# Patient Record
Sex: MALE | Race: BLACK OR AFRICAN AMERICAN | NOT HISPANIC OR LATINO | Employment: FULL TIME | URBAN - METROPOLITAN AREA
[De-identification: names, ages, dates, MRNs, and addresses within clinical notes are randomized per-mention and may not be internally consistent; named-entity substitution may affect disease eponyms.]

---

## 2017-09-28 ENCOUNTER — HOSPITAL ENCOUNTER (EMERGENCY)
Facility: OTHER | Age: 47
Discharge: HOME | End: 2017-09-28
Attending: EMERGENCY MEDICINE
Payer: COMMERCIAL

## 2017-09-28 VITALS
HEIGHT: 67 IN | TEMPERATURE: 99 F | HEART RATE: 88 BPM | RESPIRATION RATE: 18 BRPM | BODY MASS INDEX: 27.94 KG/M2 | OXYGEN SATURATION: 99 % | WEIGHT: 178 LBS | DIASTOLIC BLOOD PRESSURE: 81 MMHG | SYSTOLIC BLOOD PRESSURE: 124 MMHG

## 2017-09-28 DIAGNOSIS — T14.90XA INJURY: ICD-10-CM

## 2017-09-28 DIAGNOSIS — S93.401A SPRAIN OF RIGHT ANKLE, UNSPECIFIED LIGAMENT, INITIAL ENCOUNTER: Primary | ICD-10-CM

## 2017-09-28 PROCEDURE — 25000003 PHARM REV CODE 250: Performed by: NURSE PRACTITIONER

## 2017-09-28 PROCEDURE — 99283 EMERGENCY DEPT VISIT LOW MDM: CPT

## 2017-09-28 RX ORDER — IBUPROFEN 600 MG/1
600 TABLET ORAL
Status: COMPLETED | OUTPATIENT
Start: 2017-09-28 | End: 2017-09-28

## 2017-09-28 RX ORDER — DICLOFENAC SODIUM 50 MG/1
50 TABLET, DELAYED RELEASE ORAL 3 TIMES DAILY PRN
Qty: 24 TABLET | Refills: 0 | Status: SHIPPED | OUTPATIENT
Start: 2017-09-28 | End: 2017-10-02

## 2017-09-28 RX ADMIN — IBUPROFEN 600 MG: 600 TABLET, FILM COATED ORAL at 12:09

## 2017-09-28 NOTE — ED PROVIDER NOTES
Encounter Date: 9/28/2017       History     Chief Complaint   Patient presents with    Ankle Pain     A 47-year-old male who presents to the ED with complaints of right ankle pain.  Patient states he was coming down off the ladder and twisted his ankle on yesterday.  Pain is a 7/10.  Pain is described as a sharp pain. Pt took Motrin 800 mg last night with some relief. Pt ambulating with crutches.           Review of patient's allergies indicates:  No Known Allergies  Past Medical History:   Diagnosis Date    Allergy     Hypertension      History reviewed. No pertinent surgical history.  Family History   Problem Relation Age of Onset    Cancer Father      colon    No Known Problems Mother     No Known Problems Sister     No Known Problems Brother     No Known Problems Maternal Aunt     No Known Problems Maternal Uncle     No Known Problems Paternal Aunt     No Known Problems Paternal Uncle     No Known Problems Maternal Grandmother     No Known Problems Maternal Grandfather     No Known Problems Paternal Grandmother     No Known Problems Paternal Grandfather     Amblyopia Neg Hx     Blindness Neg Hx     Cataracts Neg Hx     Diabetes Neg Hx     Glaucoma Neg Hx     Hypertension Neg Hx     Macular degeneration Neg Hx     Retinal detachment Neg Hx     Strabismus Neg Hx     Stroke Neg Hx     Thyroid disease Neg Hx      Social History   Substance Use Topics    Smoking status: Never Smoker    Smokeless tobacco: Not on file    Alcohol use No      Comment: occasional     Review of Systems   Constitutional: Negative for activity change and appetite change.   HENT: Negative.    Eyes: Negative.  Negative for visual disturbance.   Respiratory: Negative for chest tightness and shortness of breath.    Cardiovascular: Negative.  Negative for chest pain and palpitations.   Gastrointestinal: Negative.  Negative for abdominal pain and vomiting.   Musculoskeletal: Negative for arthralgias.        Right ankle  pain    Skin: Negative.  Negative for pallor.   Neurological: Negative.  Negative for dizziness, weakness and headaches.   Hematological: Negative.    Psychiatric/Behavioral: Negative.  Negative for agitation, confusion and suicidal ideas.   All other systems reviewed and are negative.      Physical Exam     Initial Vitals [09/28/17 1233]   BP Pulse Resp Temp SpO2   124/81 88 18 98.6 °F (37 °C) 99 %      MAP       95.33         Physical Exam    Nursing note and vitals reviewed.  Constitutional: Vital signs are normal. He appears well-developed. He is cooperative.   HENT:   Head: Normocephalic.   Right Ear: Tympanic membrane and external ear normal.   Left Ear: Tympanic membrane and external ear normal.   Nose: Nose normal.   Mouth/Throat: Uvula is midline, oropharynx is clear and moist and mucous membranes are normal.   Eyes: Conjunctivae and EOM are normal. Pupils are equal, round, and reactive to light.   Neck: Normal range of motion. Neck supple.   Cardiovascular: Normal rate and regular rhythm.   Pulmonary/Chest: Effort normal and breath sounds normal.   Abdominal: Soft. Normal appearance and bowel sounds are normal. There is no tenderness.   Musculoskeletal:        Right ankle: He exhibits swelling. He exhibits normal range of motion and normal pulse. Tenderness. Lateral malleolus tenderness found.   Right lower leg and right foot with no tenderness or swelling ng noted.   FROM of all other extremities.    Neurological: He is alert and oriented to person, place, and time.   Skin: Skin is warm and dry.   Psychiatric: He has a normal mood and affect. His behavior is normal. Judgment normal.         ED Course   Procedures  Labs Reviewed - No data to display     Imaging Results          X-Ray Ankle Complete Right (Final result)  Result time 09/28/17 13:01:15    Final result by Ted Pacheco MD (09/28/17 13:01:15)                 Impression:        Anterolateral right ankle nonspecific soft tissue swelling,  without acute osseous process seen, which may represent sprain.      Electronically signed by: DENIA HERNANDEZ MD, MD  Date:     09/28/17  Time:    13:01              Narrative:    COMPARISON: None    FINDINGS: 3 views right ankle.      Nonspecific soft tissue swelling overlying the lateral malleolus and also anteriorly, likely representing localized edema/contusion in the setting of trauma. Ankle mortise is otherwise intact. Bones are well mineralized.   No acute displaced fracture, dislocation, or destructive osseous process identified.  Minimal degenerative change at the tibiotalar interval.   No subcutaneous emphysema or radiodense retained foreign body.                                 Medical Decision Making:   Initial Assessment:   A 47-year-old male who presents to the ED with complaints of this and his ankle while coming off a ladder yesterday.  Patient states he twisted his ankle.  He denies head injury or LOC.  Patient presents to ED with crutches.  Right ankle with lateral malleolus swelling and tenderness, full range of motion without pain.  Patient states he took Motrin 800 mg last night with some relief.  Differential Diagnosis:   Ankle sprain, ankle fracture, ankle dislocation, ankle dislocation  Clinical Tests:   Radiological Study: Ordered and Reviewed  ED Management:  Physical exam.  Patient medicated with Motrin 600 mg by mouth with some improvement.  Right ankle x-ray with no acute fracture or dislocation. Ace wrap applied. Pt instructed on using crutches with no weight bearing. Ice prn. Elevate prn. Diclofenac prn. Follow-up with ortho on Monday no improvement.               Attending Attestation:     Physician Attestation Statement for NP/PA:   I discussed this assessment and plan of this patient with the NP/PA, but I did not personally examine the patient. The face to face encounter was performed by the NP/PA.                  ED Course      Clinical Impression:   The primary encounter diagnosis  was Sprain of right ankle, unspecified ligament, initial encounter. A diagnosis of Injury was also pertinent to this visit.                           KAMARI Abebe  09/28/17 1321       Hilary Guadalupe MD  09/28/17 7936

## 2017-10-02 ENCOUNTER — OFFICE VISIT (OUTPATIENT)
Dept: FAMILY MEDICINE | Facility: CLINIC | Age: 47
End: 2017-10-02
Payer: COMMERCIAL

## 2017-10-02 ENCOUNTER — LAB VISIT (OUTPATIENT)
Dept: LAB | Facility: HOSPITAL | Age: 47
End: 2017-10-02
Attending: FAMILY MEDICINE
Payer: COMMERCIAL

## 2017-10-02 VITALS
HEIGHT: 67 IN | SYSTOLIC BLOOD PRESSURE: 130 MMHG | TEMPERATURE: 99 F | HEART RATE: 64 BPM | BODY MASS INDEX: 28.37 KG/M2 | OXYGEN SATURATION: 98 % | WEIGHT: 180.75 LBS | DIASTOLIC BLOOD PRESSURE: 88 MMHG

## 2017-10-02 DIAGNOSIS — Z00.00 ROUTINE ADULT HEALTH MAINTENANCE: ICD-10-CM

## 2017-10-02 DIAGNOSIS — S93.401A SPRAIN OF RIGHT ANKLE, UNSPECIFIED LIGAMENT, INITIAL ENCOUNTER: Primary | ICD-10-CM

## 2017-10-02 DIAGNOSIS — Z00.00 ROUTINE ADULT HEALTH MAINTENANCE: Primary | ICD-10-CM

## 2017-10-02 LAB
ALBUMIN SERPL BCP-MCNC: 4 G/DL
ALP SERPL-CCNC: 79 U/L
ALT SERPL W/O P-5'-P-CCNC: 24 U/L
ANION GAP SERPL CALC-SCNC: 7 MMOL/L
AST SERPL-CCNC: 15 U/L
BILIRUB SERPL-MCNC: 0.8 MG/DL
BUN SERPL-MCNC: 11 MG/DL
CALCIUM SERPL-MCNC: 10.1 MG/DL
CHLORIDE SERPL-SCNC: 101 MMOL/L
CHOLEST SERPL-MCNC: 212 MG/DL
CHOLEST/HDLC SERPL: 5.2 {RATIO}
CO2 SERPL-SCNC: 30 MMOL/L
CREAT SERPL-MCNC: 1.1 MG/DL
ERYTHROCYTE [DISTWIDTH] IN BLOOD BY AUTOMATED COUNT: 13 %
EST. GFR  (AFRICAN AMERICAN): >60 ML/MIN/1.73 M^2
EST. GFR  (NON AFRICAN AMERICAN): >60 ML/MIN/1.73 M^2
ESTIMATED AVG GLUCOSE: 108 MG/DL
GLUCOSE SERPL-MCNC: 100 MG/DL
HBA1C MFR BLD HPLC: 5.4 %
HCT VFR BLD AUTO: 43.1 %
HDLC SERPL-MCNC: 41 MG/DL
HDLC SERPL: 19.3 %
HGB BLD-MCNC: 14.6 G/DL
LDLC SERPL CALC-MCNC: 147.2 MG/DL
MCH RBC QN AUTO: 27.8 PG
MCHC RBC AUTO-ENTMCNC: 33.9 G/DL
MCV RBC AUTO: 82 FL
NONHDLC SERPL-MCNC: 171 MG/DL
PLATELET # BLD AUTO: 304 K/UL
PMV BLD AUTO: 9.9 FL
POTASSIUM SERPL-SCNC: 3.8 MMOL/L
PROT SERPL-MCNC: 8.2 G/DL
RBC # BLD AUTO: 5.26 M/UL
SODIUM SERPL-SCNC: 138 MMOL/L
TRIGL SERPL-MCNC: 119 MG/DL
WBC # BLD AUTO: 13.86 K/UL

## 2017-10-02 PROCEDURE — 3075F SYST BP GE 130 - 139MM HG: CPT | Mod: S$GLB,,, | Performed by: FAMILY MEDICINE

## 2017-10-02 PROCEDURE — 85027 COMPLETE CBC AUTOMATED: CPT

## 2017-10-02 PROCEDURE — 3008F BODY MASS INDEX DOCD: CPT | Mod: S$GLB,,, | Performed by: FAMILY MEDICINE

## 2017-10-02 PROCEDURE — 80061 LIPID PANEL: CPT

## 2017-10-02 PROCEDURE — 83036 HEMOGLOBIN GLYCOSYLATED A1C: CPT

## 2017-10-02 PROCEDURE — 99203 OFFICE O/P NEW LOW 30 MIN: CPT | Mod: S$GLB,,, | Performed by: FAMILY MEDICINE

## 2017-10-02 PROCEDURE — 36415 COLL VENOUS BLD VENIPUNCTURE: CPT | Mod: PO

## 2017-10-02 PROCEDURE — 3079F DIAST BP 80-89 MM HG: CPT | Mod: S$GLB,,, | Performed by: FAMILY MEDICINE

## 2017-10-02 PROCEDURE — 80053 COMPREHEN METABOLIC PANEL: CPT

## 2017-10-02 PROCEDURE — 99999 PR PBB SHADOW E&M-EST. PATIENT-LVL III: CPT | Mod: PBBFAC,,, | Performed by: FAMILY MEDICINE

## 2017-10-02 RX ORDER — NAPROXEN 500 MG/1
500 TABLET ORAL 2 TIMES DAILY
Qty: 14 TABLET | Refills: 0 | Status: SHIPPED | OUTPATIENT
Start: 2017-10-02 | End: 2018-01-18 | Stop reason: SDUPTHER

## 2017-10-02 NOTE — PROGRESS NOTES
Office Visit    Patient Name: Dwayne Candelario    : 1970  MRN: [id number]      Assessment/Plan:  Dwayne Candelario is a 47 y.o. male who presents today for :    Sprain of right ankle, unspecified ligament, initial encounter  -reassurance provided - advised RICE  - advised pt that pain may last up to 4-6 weeks, but in the meantime, advised to add heat/massage and avoid provocative movements that could worsen pain, maintain good posture  -initiate Naproxen advised adding Tylenol in between doses of NSAIDs as needed for pain. Side effects and precautions given.   -counseled patient extensively on stretching/strengthening exercises, to help with decreasing risk for future injury.    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    XR from ED reviewed    Other orders  -     naproxen (NAPROSYN) 500 MG tablet; Take 1 tablet (500 mg total) by mouth 2 (two) times daily.  Dispense: 14 tablet; Refill: 0            Return in about 4 weeks (around 10/30/2017) for Follow Up.     This note was created by combination of typed  and Dragon dictation.  Transcription errors may be present.  If there are any questions, please contact me.      ----------------------------------------------------------------------------------------------------------------------      HPI:  Dwayne is a 47 y.o. male who presents today for:        This patient is new to me.     Ankle Injury      Pt slipped off of ladder about 5 days ago, landed and rolled his R ankle. Initial pain is 7/10 and ankle swelled up. Pt went to Ed, XR showed no fractures and pt was sent home on conservative treatment.    Pt has since iced his ankle and kept it elevated. Swelling pain has reduced significantly, but he was still having pain on the outer edge of R calf and ankle. Still using crutches as he can't put all of his body weight.  Pt takes ibuprofen as needed at home, only used it about 2-3 times.    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    Additional ROS  No F/C/wt changes/fatigue  No nausea/vomiting/abd pain/blood in stool, no diarrhea, no constipation  No tingling/numbness/swelling    There is no problem list on file for this patient.      History reviewed. No pertinent surgical history.    Family History   Problem Relation Age of Onset    Cancer Father      colon    No Known Problems Mother     No Known Problems Sister     No Known Problems Brother     No Known Problems Maternal Aunt     No Known Problems Maternal Uncle     No Known Problems Paternal Aunt     No Known Problems Paternal Uncle     No Known Problems Maternal Grandmother     No Known Problems Maternal Grandfather     No Known Problems Paternal Grandmother     No Known Problems Paternal Grandfather     Amblyopia Neg Hx     Blindness Neg Hx     Cataracts Neg Hx     Diabetes Neg Hx     Glaucoma Neg Hx     Hypertension Neg Hx     Macular degeneration Neg Hx     Retinal detachment Neg Hx     Strabismus Neg Hx     Stroke Neg Hx     Thyroid disease Neg Hx        Social History     Social History    Marital status: Single     Spouse name: N/A    Number of children: N/A    Years of education: N/A     Occupational History    Not on file.     Social History Main Topics    Smoking status: Never Smoker    Smokeless tobacco: Never Used    Alcohol use No      Comment: occasional    Drug use: No    Sexual activity: Yes     Other Topics Concern    Not on file     Social History Narrative    No narrative on file       Current Medications  Medications reviewed and updated.     Allergies   Review of patient's allergies indicates:  No Known Allergies          Review of Systems  See HPI      Physical Exam  /88 (BP Location: Right arm, Patient Position: Sitting, BP Method: X-Large (Manual))   Pulse 64   Temp 99.2 °F (37.3 °C)   Ht 5' 7" (1.702 m)   Wt 82 kg (180 lb 12.4 oz)   SpO2 98%   BMI 28.31 kg/m²     GEN: " NAD, well developed, pleasant, well nourished  HEENT: NCAT, PERRLA, EOMI, sclera clear, anicteric, O/P clear, MMM with no lesions  NECK: normal, supple with midline trachea, no LAD, no thyromegaly  LUNGS: CTAB, no w/r/r, no increased work of breathing   HEART: RRR, normal S1 and S2, no m/r/g, no edema  ABD: s/nt/nd, NABS  MSK: warm/well perfused  FOOT: R ankle with Mod TTP over the R malleoulus. Has limited ROM due to pain with minimal swelling, no redness, not able to fully support weight.

## 2017-10-02 NOTE — PATIENT INSTRUCTIONS

## 2017-10-23 ENCOUNTER — OFFICE VISIT (OUTPATIENT)
Dept: FAMILY MEDICINE | Facility: CLINIC | Age: 47
End: 2017-10-23
Payer: COMMERCIAL

## 2017-10-23 ENCOUNTER — TELEPHONE (OUTPATIENT)
Dept: FAMILY MEDICINE | Facility: CLINIC | Age: 47
End: 2017-10-23

## 2017-10-23 VITALS
OXYGEN SATURATION: 98 % | HEIGHT: 67 IN | SYSTOLIC BLOOD PRESSURE: 130 MMHG | HEART RATE: 71 BPM | TEMPERATURE: 98 F | WEIGHT: 180 LBS | BODY MASS INDEX: 28.25 KG/M2 | DIASTOLIC BLOOD PRESSURE: 87 MMHG

## 2017-10-23 DIAGNOSIS — Z00.00 ROUTINE ADULT HEALTH MAINTENANCE: Primary | ICD-10-CM

## 2017-10-23 DIAGNOSIS — H60.90 OTITIS EXTERNA, UNSPECIFIED CHRONICITY, UNSPECIFIED LATERALITY, UNSPECIFIED TYPE: ICD-10-CM

## 2017-10-23 DIAGNOSIS — Z12.11 COLON CANCER SCREENING: ICD-10-CM

## 2017-10-23 DIAGNOSIS — S93.401D SPRAIN OF RIGHT ANKLE, UNSPECIFIED LIGAMENT, SUBSEQUENT ENCOUNTER: ICD-10-CM

## 2017-10-23 PROCEDURE — 99999 PR PBB SHADOW E&M-EST. PATIENT-LVL III: CPT | Mod: PBBFAC,,, | Performed by: FAMILY MEDICINE

## 2017-10-23 PROCEDURE — 99396 PREV VISIT EST AGE 40-64: CPT | Mod: S$GLB,,, | Performed by: FAMILY MEDICINE

## 2017-10-23 RX ORDER — CIPROFLOXACIN AND DEXAMETHASONE 3; 1 MG/ML; MG/ML
4 SUSPENSION/ DROPS AURICULAR (OTIC) 2 TIMES DAILY
Qty: 7.5 ML | Refills: 2 | Status: SHIPPED | OUTPATIENT
Start: 2017-10-23

## 2017-10-23 NOTE — TELEPHONE ENCOUNTER
----- Message from Priscilla billdoniaster sent at 10/23/2017  3:26 PM CDT -----  Contact: self  Pt is asking for a date adjustment on his return to work note.   He is asking the date be adjusted to 11/26 from 11/30.    Also, asking the the Clinic's name and the Address added to the letter.    [phone].

## 2017-10-23 NOTE — PROGRESS NOTES
Office Visit    Patient Name: Dwayne Candelario    : 1970  MRN: [id number]      Assessment/Plan:  Dwayne Candelario is a 47 y.o. male who presents today for :    Routine adult health maintenance  -previous labs reviewed  -anticipatory guidance provided  -call clinic back with any questions or concerns      Otitis externa, unspecified chronicity, unspecified laterality, unspecified type  -     ciprofloxacin-dexamethasone 0.3-0.1% (CIPRODEX) 0.3-0.1 % DrpS; Place 4 drops into both ears 2 (two) times daily.  Dispense: 7.5 mL; Refill: 2    Sprain of right ankle, unspecified ligament, subsequent encounter  -much improved  -cont RICE, ankle brace as needed  -work form filled out for light duties    Colon cancer screening  -     Ambulatory consult to Gastroenterology        Return for any evaluation as needed.     This note was created by combination of typed  and Dragon dictation.  Transcription errors may be present.  If there are any questions, please contact me.                  ----------------------------------------------------------------------------------------------------------------------      HPI:  Dwayne is a 47 y.o. male who presents today for:    Ankle Injury        This patient has multiple medical diagnoses as noted below.        This patient is known to me and to this clinic. The patient's last visit with me was on 10/2/2017.   he is here to f/u on sprain ankle    Patient is here today for routine annual PCP preventative history and physical examination.  Patient is doing well and has no major complaints.    Drug use? no  Tobacco use? no  Alcohol use? no    Wants referral for C-scope.  Had one done about 8 years ago that was normal. Has Fhx of Colon cancer in father prior to age 50.        Additional ROS  No vision changes  No F/C/wt changes/fatigue  No dysphagia/sore throat/rhinorrhea, +mild L ear pain on/off x 3  Weeks - has h/o swimmers ear.  No CP/SOB/palpitations/swelling  No  "cough/wheezing/SOB  No nausea/vomiting/abd pain/blood in stool, no diarrhea, no constipation  No muscle aches/joint pain  No rashes  No weakness/HA/tingling/numbness  No anxiety/depression    No dysuria/hematuria    There is no problem list on file for this patient.      History reviewed. No pertinent surgical history.    Family History   Problem Relation Age of Onset    Cancer Father      colon    No Known Problems Mother     No Known Problems Sister     No Known Problems Brother     No Known Problems Maternal Aunt     No Known Problems Maternal Uncle     No Known Problems Paternal Aunt     No Known Problems Paternal Uncle     No Known Problems Maternal Grandmother     No Known Problems Maternal Grandfather     No Known Problems Paternal Grandmother     No Known Problems Paternal Grandfather     Amblyopia Neg Hx     Blindness Neg Hx     Cataracts Neg Hx     Diabetes Neg Hx     Glaucoma Neg Hx     Hypertension Neg Hx     Macular degeneration Neg Hx     Retinal detachment Neg Hx     Strabismus Neg Hx     Stroke Neg Hx     Thyroid disease Neg Hx        Social History     Social History    Marital status: Single     Spouse name: N/A    Number of children: N/A    Years of education: N/A     Occupational History    Not on file.     Social History Main Topics    Smoking status: Never Smoker    Smokeless tobacco: Never Used    Alcohol use No      Comment: occasional    Drug use: No    Sexual activity: Yes     Other Topics Concern    Not on file     Social History Narrative    No narrative on file       Current Medications  Medications reviewed and updated.     Allergies   Review of patient's allergies indicates:  No Known Allergies          Review of Systems  See HPI      Physical Exam  /87 (BP Location: Left arm, Patient Position: Sitting, BP Method: Large (Manual))   Pulse 71   Temp 98.2 °F (36.8 °C) (Oral)   Ht 5' 7" (1.702 m)   Wt 81.6 kg (180 lb)   SpO2 98%   BMI 28.19 " kg/m²     GEN: NAD, well developed, pleasant, well nourished  HEENT: NCAT, PERRLA, EOMI, sclera clear, anicteric, O/P clear, MMM with no lesions,  Bilateral ear canal with moderate cerumen - slight TTP with manipulation of both auricles  NECK: normal, supple with midline trachea, no LAD, no thyromegaly  LUNGS: CTAB, no w/r/r, no increased work of breathing   HEART: RRR, normal S1 and S2, no m/r/g, no edema  ABD: s/nt/nd, NABS  MSK: warm/well perfused, normal ROM in all 4 extremities, no c/c/e. R ankle with very mild swelling over Lateral malleolus - good ROM, able to support weight. No redness  SKIN: normal turgor, no rashes  PSYCH: AOx3, appropriate mood and affect      Labs  Lab Results   Component Value Date    HGBA1C 5.4 10/02/2017     Lab Results   Component Value Date     10/02/2017    K 3.8 10/02/2017     10/02/2017    CO2 30 (H) 10/02/2017    BUN 11 10/02/2017    CREATININE 1.1 10/02/2017    CALCIUM 10.1 10/02/2017    ANIONGAP 7 (L) 10/02/2017    ESTGFRAFRICA >60.0 10/02/2017    EGFRNONAA >60.0 10/02/2017     Lab Results   Component Value Date    CHOL 212 (H) 10/02/2017     Lab Results   Component Value Date    HDL 41 10/02/2017     Lab Results   Component Value Date    LDLCALC 147.2 10/02/2017     Lab Results   Component Value Date    TRIG 119 10/02/2017     Lab Results   Component Value Date    CHOLHDL 19.3 (L) 10/02/2017     Last set of blood work has been reviewed as noted above.      Health Maintenance  Health Maintenance       Date Due Completion Date    TETANUS VACCINE 06/16/1988 ---    Influenza Vaccine 08/01/2017 ---    Lipid Panel 10/02/2022 10/2/2017

## 2017-11-20 ENCOUNTER — OFFICE VISIT (OUTPATIENT)
Dept: FAMILY MEDICINE | Facility: CLINIC | Age: 47
End: 2017-11-20
Payer: COMMERCIAL

## 2017-11-20 VITALS
TEMPERATURE: 99 F | DIASTOLIC BLOOD PRESSURE: 88 MMHG | SYSTOLIC BLOOD PRESSURE: 110 MMHG | HEART RATE: 78 BPM | OXYGEN SATURATION: 97 % | BODY MASS INDEX: 29.07 KG/M2 | HEIGHT: 67 IN | WEIGHT: 185.19 LBS

## 2017-11-20 DIAGNOSIS — S93.401D SPRAIN OF RIGHT ANKLE, UNSPECIFIED LIGAMENT, SUBSEQUENT ENCOUNTER: Primary | ICD-10-CM

## 2017-11-20 PROCEDURE — 99999 PR PBB SHADOW E&M-EST. PATIENT-LVL III: CPT | Mod: PBBFAC,,, | Performed by: FAMILY MEDICINE

## 2017-11-20 PROCEDURE — 99213 OFFICE O/P EST LOW 20 MIN: CPT | Mod: S$GLB,,, | Performed by: FAMILY MEDICINE

## 2017-11-20 NOTE — PROGRESS NOTES
Office Visit    Patient Name: Dwayne Candelario    : 1970  MRN: [id number]      Assessment/Plan:  Dwayne Candelario is a 47 y.o. male who presents today for :    Sprain of right ankle, unspecified ligament, subsequent encounter    -resolved  -work precautions given  -continue with daily stretching to improve flexibility    Return for worsening Sx or as needed.     This note was created by combination of typed  and Dragon dictation.  Transcription errors may be present.  If there are any questions, please contact me.        ----------------------------------------------------------------------------------------------------------------------      HPI:  Dwayne is a 47 y.o. male who presents today for:    Ankle Pain        This patient has multiple medical diagnoses as noted below.    This patient is known to me and to this clinic. The patient's last visit with me was on 10/23/2017.      Patient is here today for Ankle Pain  follow up. He initially saw me 7 weeks ago after rolling his ankle. Since then, his condition has improved after instituting RICE, and he currently has no pain.  He still has mild stiffness, but is able to perform his daily routine as .  +mild ankle swelling/redness        There is no problem list on file for this patient.      History reviewed. No pertinent surgical history.    Family History   Problem Relation Age of Onset    Cancer Father      colon    No Known Problems Mother     No Known Problems Sister     No Known Problems Brother     No Known Problems Maternal Aunt     No Known Problems Maternal Uncle     No Known Problems Paternal Aunt     No Known Problems Paternal Uncle     No Known Problems Maternal Grandmother     No Known Problems Maternal Grandfather     No Known Problems Paternal Grandmother     No Known Problems Paternal Grandfather     Amblyopia Neg Hx     Blindness Neg Hx     Cataracts Neg Hx     Diabetes Neg Hx     Glaucoma Neg Hx      "Hypertension Neg Hx     Macular degeneration Neg Hx     Retinal detachment Neg Hx     Strabismus Neg Hx     Stroke Neg Hx     Thyroid disease Neg Hx        Social History     Social History    Marital status: Single     Spouse name: N/A    Number of children: N/A    Years of education: N/A     Occupational History    Not on file.     Social History Main Topics    Smoking status: Never Smoker    Smokeless tobacco: Never Used    Alcohol use No      Comment: occasional    Drug use: No    Sexual activity: Yes     Other Topics Concern    Not on file     Social History Narrative    No narrative on file       Current Medications  Medications reviewed and updated.     Allergies   Review of patient's allergies indicates:  No Known Allergies          Review of Systems  See HPI      Physical Exam  /88 (BP Location: Right arm, Patient Position: Sitting, BP Method: Large (Manual))   Pulse 78   Temp 98.5 °F (36.9 °C) (Oral)   Ht 5' 7" (1.702 m)   Wt 84 kg (185 lb 3 oz)   SpO2 97%   BMI 29.00 kg/m²     GEN: NAD, well developed, pleasant, well nourished  SKIN: normal turgor, no rashes  PSYCH: AOx3, appropriate mood and affect  MSK: warm/well perfused, normal ROM in all 4 extremities, no c/c/e.  ANKLE: mild R ankle swelling, has good ROM. No TTP/erythema    Labs  Lab Results   Component Value Date    HGBA1C 5.4 10/02/2017     Lab Results   Component Value Date     10/02/2017    K 3.8 10/02/2017     10/02/2017    CO2 30 (H) 10/02/2017    BUN 11 10/02/2017    CREATININE 1.1 10/02/2017    CALCIUM 10.1 10/02/2017    ANIONGAP 7 (L) 10/02/2017    ESTGFRAFRICA >60.0 10/02/2017    EGFRNONAA >60.0 10/02/2017     Lab Results   Component Value Date    CHOL 212 (H) 10/02/2017     Lab Results   Component Value Date    HDL 41 10/02/2017     Lab Results   Component Value Date    LDLCALC 147.2 10/02/2017     Lab Results   Component Value Date    TRIG 119 10/02/2017     Lab Results   Component Value Date    " CHOLHDL 19.3 (L) 10/02/2017     Last set of blood work has been reviewed as noted above.      Health Maintenance  Health Maintenance       Date Due Completion Date    TETANUS VACCINE 06/16/1988 ---    Influenza Vaccine 08/01/2017 ---    Lipid Panel 10/02/2022 10/2/2017

## 2017-11-20 NOTE — LETTER
November 20, 2017      [facility]  [address] [facility]  [place] [address]  Phone: [phone]  Fax: [phone]       Patient: Dwayne Candelario   YOB: 1970  Date of Visit: 11/20/2017    To Whom It May Concern:    Keyonna Candelario  was at [facility] on 11/20/2017. He may return to work/school on 11/27/17 with no restrictions. If you have any questions or concerns, or if I can be of further assistance, please do not hesitate to contact me.    Sincerely,    Darien Root MD

## 2017-11-30 ENCOUNTER — TELEPHONE (OUTPATIENT)
Dept: FAMILY MEDICINE | Facility: CLINIC | Age: 47
End: 2017-11-30

## 2017-11-30 NOTE — TELEPHONE ENCOUNTER
----- Message from Priscilla Vickie sent at 11/30/2017 12:54 PM CST -----  Contact: self  Pt wanted to inform staff that he is going to drop off paperwork for Short Term Disability today. [phone]

## 2018-01-18 ENCOUNTER — OFFICE VISIT (OUTPATIENT)
Dept: FAMILY MEDICINE | Facility: CLINIC | Age: 48
End: 2018-01-18
Payer: COMMERCIAL

## 2018-01-18 VITALS
OXYGEN SATURATION: 97 % | HEART RATE: 65 BPM | WEIGHT: 188.38 LBS | TEMPERATURE: 98 F | HEIGHT: 67 IN | DIASTOLIC BLOOD PRESSURE: 90 MMHG | BODY MASS INDEX: 29.57 KG/M2 | SYSTOLIC BLOOD PRESSURE: 120 MMHG

## 2018-01-18 DIAGNOSIS — M25.511 ACUTE PAIN OF RIGHT SHOULDER: Primary | ICD-10-CM

## 2018-01-18 DIAGNOSIS — E66.3 OVERWEIGHT (BMI 25.0-29.9): ICD-10-CM

## 2018-01-18 DIAGNOSIS — R06.83 SNORING: ICD-10-CM

## 2018-01-18 PROCEDURE — 99999 PR PBB SHADOW E&M-EST. PATIENT-LVL III: CPT | Mod: PBBFAC,,, | Performed by: FAMILY MEDICINE

## 2018-01-18 PROCEDURE — 99214 OFFICE O/P EST MOD 30 MIN: CPT | Mod: S$GLB,,, | Performed by: FAMILY MEDICINE

## 2018-01-18 RX ORDER — CYCLOBENZAPRINE HCL 10 MG
10 TABLET ORAL NIGHTLY
Qty: 21 TABLET | Refills: 0 | Status: SHIPPED | OUTPATIENT
Start: 2018-01-18

## 2018-01-18 RX ORDER — NAPROXEN 500 MG/1
500 TABLET ORAL 2 TIMES DAILY
Qty: 30 TABLET | Refills: 0 | Status: SHIPPED | OUTPATIENT
Start: 2018-01-18 | End: 2023-11-01 | Stop reason: SDUPTHER

## 2018-01-18 NOTE — PROGRESS NOTES
Office Visit    Patient Name: Dwayne Candelario    : 1970  MRN: [id number]      Assessment/Plan:  Dwayne Candelario is a 47 y.o. male who presents today for :    Acute pain of right shoulder  -     naproxen (NAPROSYN) 500 MG tablet; Take 1 tablet (500 mg total) by mouth 2 (two) times daily.  Dispense: 30 tablet; Refill: 0  -     cyclobenzaprine (FLEXERIL) 10 MG tablet; Take 1 tablet (10 mg total) by mouth every evening.  Dispense: 21 tablet; Refill: 0    -activity modification d/w patient: avoid reaching overhead activities, avoid heavy lifting and provocative movements, alternate ice/heat/deep massage.   -shoulder ROM stretching and strengthening exercises demonstrated in clinic and handouts given to patient  -RTC in 4-6 weeks if not better, or sooner if pain worsens - may consider XR and PT at that time if still not better  -patient advised to use OTC Tylenol (325mg tablets) once every 4-6 hours as needed for pain      Snoring  Overweight (BMI 25.0-29.9)    Wt Readings from Last 4 Encounters:   18 85.5 kg (188 lb 6.1 oz)   17 84 kg (185 lb 3 oz)   10/23/17 81.6 kg (180 lb)   10/02/17 82 kg (180 lb 12.4 oz)     Body mass index is 29.5 kg/m².     -plan for losing weight d/w pt.  -advised regular physical exercises/healthy nutrition and food choices.   -d/w pt about the importance of portion control  -advised moderate consumption of alcohol and benefits of avoiding tobacco use/substance abuse.  -stressed to patient about healthy ways of losing weight through exercise and nutrition, which can also help lower cholesterol/BP/BG, as well as potentially helping with improving his snoring.  -patient and wife will defer sleep study at this time, and will f/u in 3 months to recheck weight and re-assess need for further intervention for weight loss.    Follow-up in about 3 months (around 2018) for worsening Sx. Urgent care/ED precautions provided.     This note was created by combination of typed   and Dragon dictation.  Transcription errors may be present.  If there are any questions, please contact me.        ----------------------------------------------------------------------------------------------------------------------      HPI:  Dwayne is a 47 y.o. male who presents today for:    Shoulder Pain        This patient has multiple medical diagnoses as noted below.      Patient is here with his wife today for RIGHT Shoulder Pain    That started over a month ago, pain comes and goes - no apparent injury/trauma/falls recently.  Pain level: at worse it's 7/10 - feels tolerable right now. Worse with laying on the right shoulder in bed. Feels that he has good ROM.   Pt is still able to perform daily routines comfortably.  Denies any wt loss/fatigue/malaise/pain radiation to down the arm to the hand, no radiation to the neck. Denies tingling/numbness/weakness.  Patient is also states that he snores loudly, and that he can fall asleep anytime during day if he is bored and sits around with nothing to do. He denies problem with sleeping, no falling asleep while doing work nor feel that he doesn't get enough rest at night.  He was contemplating getting a sleep study - but he and his wife wants to pursue weight loss first. He feels that he's eating a healthy diet with a variety of food groups, but admits that he doesn't go the gym nor engage in CV exercise on a regular basis.      Additional ROS    No dysphagia/sore throat/rhinorrhea  No CP/SOB/palpitations/swelling  No cough/wheezing/SOB  No nausea/vomiting/abd pain/no diarrhea, no constipation, blood in stool  No rash, no history of allergies to any specific substances    There is no problem list on file for this patient.      Current Medications  Medications reviewed/updated.     Current Outpatient Prescriptions on File Prior to Visit   Medication Sig Dispense Refill    ciprofloxacin-dexamethasone 0.3-0.1% (CIPRODEX) 0.3-0.1 % DrpS Place 4 drops into both ears 2  "(two) times daily. 7.5 mL 2    losartan (COZAAR) 50 MG tablet Take 50 mg by mouth once daily.      vitamin D 1000 units Tab Take 185 mg by mouth once daily.      levocetirizine (XYZAL) 5 MG tablet Take 1 tablet (5 mg total) by mouth every evening. 30 tablet 0    valacyclovir (VALTREX) 1000 MG tablet Take 1 tablet (1,000 mg total) by mouth 3 (three) times daily. 21 tablet 0     No current facility-administered medications on file prior to visit.        History reviewed. No pertinent surgical history.    Family History   Problem Relation Age of Onset    Cancer Father      colon    No Known Problems Mother     No Known Problems Sister     No Known Problems Brother     No Known Problems Maternal Aunt     No Known Problems Maternal Uncle     No Known Problems Paternal Aunt     No Known Problems Paternal Uncle     No Known Problems Maternal Grandmother     No Known Problems Maternal Grandfather     No Known Problems Paternal Grandmother     No Known Problems Paternal Grandfather     Amblyopia Neg Hx     Blindness Neg Hx     Cataracts Neg Hx     Diabetes Neg Hx     Glaucoma Neg Hx     Hypertension Neg Hx     Macular degeneration Neg Hx     Retinal detachment Neg Hx     Strabismus Neg Hx     Stroke Neg Hx     Thyroid disease Neg Hx        Social History     Social History    Marital status: Single     Spouse name: N/A    Number of children: N/A    Years of education: N/A     Occupational History    Not on file.     Social History Main Topics    Smoking status: Never Smoker    Smokeless tobacco: Never Used    Alcohol use No      Comment: occasional    Drug use: No    Sexual activity: Yes     Other Topics Concern    Not on file     Social History Narrative    No narrative on file           Allergies   Review of patient's allergies indicates:  No Known Allergies          Review of Systems  See HPI      Physical Exam  BP (!) 120/90   Pulse 65   Temp 97.8 °F (36.6 °C) (Oral)   Ht 5' 7" " (1.702 m)   Wt 85.5 kg (188 lb 6.1 oz)   SpO2 97%   BMI 29.50 kg/m²     GEN: NAD, well developed, pleasant, well nourished  HEENT: NCAT, PERRLA, EOMI, sclera clear, anicteric, O/P clear, MMM with no lesions  NECK: normal, supple with midline trachea, no LAD, no thyromegaly  LUNGS: CTAB, no w/r/r, no increased work of breathing   HEART: RRR, normal S1 and S2, no m/r/g, no edema  ABD: s/nt/nd, NABS  SKIN: normal turgor, no rashes  PSYCH: AOx3, appropriate mood and affect  MSK: warm/well perfused, normal ROM in all 4 extremities, no c/c/e.  R shoulder with FROM. No swelling/erythema. +moderate TTP over the right anterior shoulder.  Normal Flexion/extention/abduction/adduction/empty can/push off/internal and external rotation.

## 2018-03-09 ENCOUNTER — OFFICE VISIT (OUTPATIENT)
Dept: PAIN MEDICINE | Facility: CLINIC | Age: 48
End: 2018-03-09
Attending: ANESTHESIOLOGY
Payer: COMMERCIAL

## 2018-03-09 VITALS
HEART RATE: 97 BPM | HEIGHT: 67 IN | SYSTOLIC BLOOD PRESSURE: 120 MMHG | TEMPERATURE: 99 F | DIASTOLIC BLOOD PRESSURE: 83 MMHG | WEIGHT: 186.75 LBS | BODY MASS INDEX: 29.31 KG/M2

## 2018-03-09 DIAGNOSIS — M54.12 CERVICAL RADICULOPATHY: ICD-10-CM

## 2018-03-09 DIAGNOSIS — M75.101 ROTATOR CUFF SYNDROME OF RIGHT SHOULDER: Primary | ICD-10-CM

## 2018-03-09 DIAGNOSIS — M79.10 MYALGIA: ICD-10-CM

## 2018-03-09 PROCEDURE — 99204 OFFICE O/P NEW MOD 45 MIN: CPT | Mod: S$GLB,,, | Performed by: ANESTHESIOLOGY

## 2018-03-09 PROCEDURE — 99999 PR PBB SHADOW E&M-EST. PATIENT-LVL III: CPT | Mod: PBBFAC,,, | Performed by: ANESTHESIOLOGY

## 2018-03-09 RX ORDER — METHYLPREDNISOLONE 4 MG/1
TABLET ORAL
Qty: 1 PACKAGE | Refills: 0 | Status: SHIPPED | OUTPATIENT
Start: 2018-03-09 | End: 2018-03-30
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
## 2018-03-16 ENCOUNTER — HOSPITAL ENCOUNTER (OUTPATIENT)
Dept: RADIOLOGY | Facility: HOSPITAL | Age: 48
Discharge: HOME | End: 2018-03-16
Attending: ANESTHESIOLOGY
Payer: COMMERCIAL

## 2018-03-16 DIAGNOSIS — M75.101 ROTATOR CUFF SYNDROME OF RIGHT SHOULDER: ICD-10-CM

## 2018-03-16 DIAGNOSIS — M79.10 MYALGIA: ICD-10-CM

## 2018-03-16 DIAGNOSIS — M54.12 CERVICAL RADICULOPATHY: ICD-10-CM

## 2018-03-16 PROCEDURE — 73221 MRI JOINT UPR EXTREM W/O DYE: CPT | Mod: TC,RT

## 2018-03-16 PROCEDURE — 73030 X-RAY EXAM OF SHOULDER: CPT | Mod: 26,LT,, | Performed by: RADIOLOGY

## 2018-03-16 PROCEDURE — 72052 X-RAY EXAM NECK SPINE 6/>VWS: CPT | Mod: TC,FY

## 2018-03-16 PROCEDURE — 72052 X-RAY EXAM NECK SPINE 6/>VWS: CPT | Mod: 26,,, | Performed by: RADIOLOGY

## 2018-03-16 PROCEDURE — 73030 X-RAY EXAM OF SHOULDER: CPT | Mod: 26,RT,, | Performed by: RADIOLOGY

## 2018-03-16 PROCEDURE — 73221 MRI JOINT UPR EXTREM W/O DYE: CPT | Mod: 26,RT,, | Performed by: RADIOLOGY

## 2018-03-16 PROCEDURE — 73030 X-RAY EXAM OF SHOULDER: CPT | Mod: TC,50,FY

## 2018-03-26 ENCOUNTER — TELEPHONE (OUTPATIENT)
Dept: PAIN MEDICINE | Facility: CLINIC | Age: 48
End: 2018-03-26

## 2018-03-26 NOTE — TELEPHONE ENCOUNTER
----- Message from Oni Reyes MD sent at 3/26/2018 12:04 PM CDT -----  Can you please make an appointment for him to see orthopedics?  He has a tear in the rotator cuff and labrum of his shoulder.    Send him to Dr. Hernandez.    Thanks    Oni Reyes

## 2018-03-29 ENCOUNTER — OFFICE VISIT (OUTPATIENT)
Dept: SPORTS MEDICINE | Facility: CLINIC | Age: 48
End: 2018-03-29
Payer: COMMERCIAL

## 2018-03-29 VITALS
HEIGHT: 67 IN | WEIGHT: 186 LBS | DIASTOLIC BLOOD PRESSURE: 85 MMHG | BODY MASS INDEX: 29.19 KG/M2 | SYSTOLIC BLOOD PRESSURE: 132 MMHG | HEART RATE: 67 BPM

## 2018-03-29 DIAGNOSIS — M25.511 RIGHT SHOULDER PAIN: Primary | ICD-10-CM

## 2018-03-29 DIAGNOSIS — M75.100 ROTATOR CUFF TEAR: ICD-10-CM

## 2018-03-29 PROCEDURE — 20610 DRAIN/INJ JOINT/BURSA W/O US: CPT | Mod: RT,S$GLB,, | Performed by: ORTHOPAEDIC SURGERY

## 2018-03-29 PROCEDURE — 99203 OFFICE O/P NEW LOW 30 MIN: CPT | Mod: 25,S$GLB,, | Performed by: ORTHOPAEDIC SURGERY

## 2018-03-29 PROCEDURE — 99999 PR PBB SHADOW E&M-EST. PATIENT-LVL III: CPT | Mod: PBBFAC,,, | Performed by: ORTHOPAEDIC SURGERY

## 2018-03-29 PROCEDURE — 3079F DIAST BP 80-89 MM HG: CPT | Mod: CPTII,S$GLB,, | Performed by: ORTHOPAEDIC SURGERY

## 2018-03-29 PROCEDURE — 3075F SYST BP GE 130 - 139MM HG: CPT | Mod: CPTII,S$GLB,, | Performed by: ORTHOPAEDIC SURGERY

## 2018-03-29 RX ORDER — TRIAMCINOLONE ACETONIDE 40 MG/ML
40 INJECTION, SUSPENSION INTRA-ARTICULAR; INTRAMUSCULAR
Status: DISCONTINUED | OUTPATIENT
Start: 2018-03-29 | End: 2018-03-29 | Stop reason: HOSPADM

## 2018-03-29 RX ADMIN — TRIAMCINOLONE ACETONIDE 40 MG: 40 INJECTION, SUSPENSION INTRA-ARTICULAR; INTRAMUSCULAR at 11:03

## 2018-03-29 NOTE — LETTER
March 29, 2018      Oni Reyes MD  [address]  [address]  [facility] [address]           [facility]  [address]  [facility] [address]  Phone: [phone]          Patient: Dwayne Candelario   MR Number: [id number]   YOB: 1970   Date of Visit: 3/29/2018       Dear Dr. Oni Reyes:    Thank you for referring Dwayne Candelario to me for evaluation. Attached you will find relevant portions of my assessment and plan of care.    If you have questions, please do not hesitate to call me. I look forward to following Dwayne Candelario along with you.    Sincerely,    David Morrow MD    Enclosure  CC:  No Recipients    If you would like to receive this communication electronically, please contact [email or web address] or [phone] to request more information on [employer] access.    For providers and/or their staff who would like to refer a patient to [facility], please contact us through our one-stop-shop provider referral line, [facility], at [phone].    If you feel you have received this communication in error or would no longer like to receive these types of communications, please e-mail [email or web address]

## 2018-03-29 NOTE — PROGRESS NOTES
CC: RIGHT shoulder pain     47 y.o. Male with a history of right shoulder pain for 6 months without ROMA.   He saw his PCP who provided him a HEP.  He saw Pain Management who ordered xrays/MRI and referred him to Sports Med.  Has taken naproxen which was helpful.  Report weakness.    He reports that the pain and weakness is worse with overhead activity. It also bothers him at night.    Is affecting ADLs.  Pain is 6/10 at it's worst. He uses his left hand to write, but does everything else with right hand (sports/throwing ball).  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      Past Medical History:   Diagnosis Date    Allergy     Hypertension        History reviewed. No pertinent surgical history.    Family History   Problem Relation Age of Onset    Cancer Father      colon    No Known Problems Mother     No Known Problems Sister     No Known Problems Brother     No Known Problems Maternal Aunt     No Known Problems Maternal Uncle     No Known Problems Paternal Aunt     No Known Problems Paternal Uncle     No Known Problems Maternal Grandmother     No Known Problems Maternal Grandfather     No Known Problems Paternal Grandmother     No Known Problems Paternal Grandfather     Amblyopia Neg Hx     Blindness Neg Hx     Cataracts Neg Hx     Diabetes Neg Hx     Glaucoma Neg Hx     Hypertension Neg Hx     Macular degeneration Neg Hx     Retinal detachment Neg Hx     Strabismus Neg Hx     Stroke Neg Hx     Thyroid disease Neg Hx          Current Outpatient Prescriptions:     ciprofloxacin-dexamethasone 0.3-0.1% (CIPRODEX) 0.3-0.1 % DrpS, Place 4 drops into both ears 2 (two) times daily., Disp: 7.5 mL, Rfl: 2    cyclobenzaprine (FLEXERIL) 10 MG tablet, Take 1 tablet (10 mg total) by mouth every evening., Disp: 21 tablet, Rfl: 0    levocetirizine (XYZAL) 5 MG tablet, Take 1 tablet (5 mg total) by mouth every evening., Disp: 30 tablet, Rfl: 0    losartan (COZAAR) 50 MG tablet, Take 50 mg by mouth  "once daily., Disp: , Rfl:     methylPREDNISolone (MEDROL DOSEPACK) 4 mg tablet, use as directed, Disp: 1 Package, Rfl: 0    naproxen (NAPROSYN) 500 MG tablet, Take 1 tablet (500 mg total) by mouth 2 (two) times daily., Disp: 30 tablet, Rfl: 0    valacyclovir (VALTREX) 1000 MG tablet, Take 1 tablet (1,000 mg total) by mouth 3 (three) times daily., Disp: 21 tablet, Rfl: 0    vitamin D 1000 units Tab, Take 185 mg by mouth once daily., Disp: , Rfl:     Review of patient's allergies indicates:  No Known Allergies       REVIEW OF SYSTEMS:  Constitution: Negative. Negative for chills, fever and night sweats.   HENT: Negative for congestion and headaches.    Eyes: Negative for blurred vision, left vision loss and right vision loss.   Cardiovascular: Negative for chest pain and syncope.   Respiratory: Negative for cough and shortness of breath.    Endocrine: Negative for polydipsia, polyphagia and polyuria.   Hematologic/Lymphatic: Negative for bleeding problem. Does not bruise/bleed easily.   Skin: Negative for dry skin, itching and rash.   Musculoskeletal: Negative for falls.  Positive for right shoulder pain and muscle weakness.   Gastrointestinal: Negative for abdominal pain and bowel incontinence.   Genitourinary: Negative for bladder incontinence and nocturia.   Neurological: Negative for disturbances in coordination, loss of balance and seizures.   Psychiatric/Behavioral: Negative for depression. The patient does not have insomnia.    Allergic/Immunologic: Negative for hives and persistent infections.      PHYSICAL EXAMINATION:  Vitals:  /85   Pulse 67   Ht 5' 7" (1.702 m)   Wt 84.4 kg (186 lb)   BMI 29.13 kg/m²    General: The patient is alert and oriented x 3.  Mood is pleasant.  Observation of ears, eyes and nose reveal no gross abnormalities.  No labored breathing observed.  Gait is coordinated. Patient can toe walk and heel walk without difficulty.      RIGHT Shoulder / Upper Extremity " Exam    OBSERVATION:     Swelling  none  Deformity  none   Discoloration  none   Scapular winging none   Scars   none  Atrophy  none    TENDERNESS / CREPITUS (T/C):          T/C      T/C   Clavicle   -/-  SUPRAspinatus    -/-     AC Jt.   +-/-   INFRAspinatus  -/-    SC Jt.    -/-  Deltoid    -/-      G. Tuberosity  -/-  LH BICEP groove  +/-   Acromion:  -/-  Midline Neck   -/-     Scapular Spine -/-  Trapezium   -/-   SMA Scapula  -/-  GH jt. line - post  -/-     Scapulothoracic  -/-         ROM: (* = with pain)  Left shoulder   Right shoulder        AROM (PROM)   AROM (PROM)   FE    170° (175°)     160° (165°)  *   ER at 90° ABD  90°  (90°)    70°  (75°) *   IR (spine level)   T10     L5 *    STRENGTH: (* = with pain) Left shoulder   Right shoulder   SCAPTION   5/5    4/5    IR    5/5    4+/5   ER    5/5    4+/5   BICEPS   5/5    4+/5   Deltoid    5/5    5/5     SIGNS:  Painful side       NEER   +    OGEMMAS  neg    WESTON   +    SPEEDS  neg     DROP ARM   -   BELLY PRESS neg   Superior escape none    LIFT-OFF  neg   X-Body ADD    neg    MOVING VALGUS neg        STABILITY TESTING    Left shoulder   Right shoulder    Translation     Anterior  up face     up face    Posterior  up face    up face    Sulcus   < 10mm    < 10 mm     Signs   Apprehension   neg      neg       Relocation   no change     no change      Jerk test  neg     neg    EXTREMITY NEURO-VASCULAR EXAM:    Sensation grossly intact to light touch all dermatomal regions.    DTR 2+ Biceps, Triceps, BR and Negative Quianas sign   Grossly intact motor function at Elbow, Wrist and Hand   Distal pulses radial and ulnar 2+, brisk cap refill, symmetric.      NECK:  Painless FROM and spinous processes non-tender. Negative Spurlings sign.      OTHER FINDINGS:  + scapular dyskinesia    XRAYS (3/16/18):  Bones: No fracture.  No lytic or blastic lesion.    Joints: No evidence for glenohumeral dislocation.  Mild acromioclavicular arthrosis noted.    Soft  tissues: Unremarkable.    Left:    Bones: No fracture.  No lytic or blastic lesion.    Joints: No evidence for glenohumeral dislocation.  Mild acromioclavicular arthrosis noted.    Soft tissues: Unremarkable.    MRI (3/16/18):  1. Bursal surface tear of infraspinatus tendon.  2. Circumferential degeneration of the labrum with probable tear superiorly.  3. Mild acromioclavicular arthrosis.      ASSESSMENT:     Right shoulder pain partial thickness rotator cuff tear, SLAP tear, and AC DJD  Right shoulder adhesive capsulitis    PLAN:    1. Cortisone injection today  2. Physical therapy  3. If not improvement, recommend scope

## 2018-03-29 NOTE — PROCEDURES
Large Joint Aspiration/Injection  Date/Time: 3/29/2018 11:17 AM  Performed by: ALETHA CABAN  Authorized by: ALETHA CABAN     Consent Done?:  Yes (Verbal)  Indications:  Pain  Procedure site marked: Yes    Timeout: Prior to procedure the correct patient, procedure, and site was verified      Location:  Shoulder  Site:  R subacromial bursa  Prep: Patient was prepped and draped in usual sterile fashion    Ultrasonic Guidance for needle placement: No  Needle size:  22 G  Approach:  Posterior  Medications:  40 mg triamcinolone acetonide 40 mg/mL  Patient tolerance:  Patient tolerated the procedure well with no immediate complications

## 2018-05-31 ENCOUNTER — OFFICE VISIT (OUTPATIENT)
Dept: SPORTS MEDICINE | Facility: CLINIC | Age: 48
End: 2018-05-31
Payer: COMMERCIAL

## 2018-05-31 VITALS
DIASTOLIC BLOOD PRESSURE: 76 MMHG | SYSTOLIC BLOOD PRESSURE: 128 MMHG | HEART RATE: 78 BPM | BODY MASS INDEX: 29.19 KG/M2 | WEIGHT: 186 LBS | HEIGHT: 67 IN

## 2018-05-31 DIAGNOSIS — M75.100 ROTATOR CUFF TEAR: ICD-10-CM

## 2018-05-31 DIAGNOSIS — G89.29 CHRONIC RIGHT SHOULDER PAIN: Primary | ICD-10-CM

## 2018-05-31 DIAGNOSIS — M75.00 ADHESIVE CAPSULITIS: ICD-10-CM

## 2018-05-31 DIAGNOSIS — M25.511 CHRONIC RIGHT SHOULDER PAIN: Primary | ICD-10-CM

## 2018-05-31 PROCEDURE — 3074F SYST BP LT 130 MM HG: CPT | Mod: CPTII,S$GLB,, | Performed by: ORTHOPAEDIC SURGERY

## 2018-05-31 PROCEDURE — 99999 PR PBB SHADOW E&M-EST. PATIENT-LVL III: CPT | Mod: PBBFAC,,, | Performed by: ORTHOPAEDIC SURGERY

## 2018-05-31 PROCEDURE — 20610 DRAIN/INJ JOINT/BURSA W/O US: CPT | Mod: RT,S$GLB,, | Performed by: ORTHOPAEDIC SURGERY

## 2018-05-31 PROCEDURE — 3008F BODY MASS INDEX DOCD: CPT | Mod: CPTII,S$GLB,, | Performed by: ORTHOPAEDIC SURGERY

## 2018-05-31 PROCEDURE — 3078F DIAST BP <80 MM HG: CPT | Mod: CPTII,S$GLB,, | Performed by: ORTHOPAEDIC SURGERY

## 2018-05-31 PROCEDURE — 99214 OFFICE O/P EST MOD 30 MIN: CPT | Mod: 25,S$GLB,, | Performed by: ORTHOPAEDIC SURGERY

## 2018-05-31 RX ORDER — TRIAMCINOLONE ACETONIDE 40 MG/ML
40 INJECTION, SUSPENSION INTRA-ARTICULAR; INTRAMUSCULAR
Status: DISCONTINUED | OUTPATIENT
Start: 2018-05-31 | End: 2018-05-31 | Stop reason: HOSPADM

## 2018-05-31 RX ADMIN — TRIAMCINOLONE ACETONIDE 40 MG: 40 INJECTION, SUSPENSION INTRA-ARTICULAR; INTRAMUSCULAR at 02:05

## 2018-05-31 NOTE — PROGRESS NOTES
CC: RIGHT shoulder pain     47 y.o. Male with a history of right shoulder pain since September 2017 without ROMA.   Reports cortisone injection on 3/29/18 helped a great deal; recently wore off.  Continues to have night pain.  He missed his initial physical therapy appointment and did not reschedule.    He reports that the pain and weakness is worse with overhead activity.     Is affecting ADLs.  Pain is 3/10 at it's worst.     He uses his left hand to write, but does everything else with right hand (sports/throwing ball).  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      Past Medical History:   Diagnosis Date    Allergy     Hypertension        History reviewed. No pertinent surgical history.    Family History   Problem Relation Age of Onset    Cancer Father         colon    No Known Problems Mother     No Known Problems Sister     No Known Problems Brother     No Known Problems Maternal Aunt     No Known Problems Maternal Uncle     No Known Problems Paternal Aunt     No Known Problems Paternal Uncle     No Known Problems Maternal Grandmother     No Known Problems Maternal Grandfather     No Known Problems Paternal Grandmother     No Known Problems Paternal Grandfather     Amblyopia Neg Hx     Blindness Neg Hx     Cataracts Neg Hx     Diabetes Neg Hx     Glaucoma Neg Hx     Hypertension Neg Hx     Macular degeneration Neg Hx     Retinal detachment Neg Hx     Strabismus Neg Hx     Stroke Neg Hx     Thyroid disease Neg Hx          Current Outpatient Prescriptions:     ciprofloxacin-dexamethasone 0.3-0.1% (CIPRODEX) 0.3-0.1 % DrpS, Place 4 drops into both ears 2 (two) times daily., Disp: 7.5 mL, Rfl: 2    cyclobenzaprine (FLEXERIL) 10 MG tablet, Take 1 tablet (10 mg total) by mouth every evening., Disp: 21 tablet, Rfl: 0    losartan (COZAAR) 50 MG tablet, Take 50 mg by mouth once daily., Disp: , Rfl:     naproxen (NAPROSYN) 500 MG tablet, Take 1 tablet (500 mg total) by mouth 2 (two) times  "daily., Disp: 30 tablet, Rfl: 0    vitamin D 1000 units Tab, Take 185 mg by mouth once daily., Disp: , Rfl:     levocetirizine (XYZAL) 5 MG tablet, Take 1 tablet (5 mg total) by mouth every evening., Disp: 30 tablet, Rfl: 0    valacyclovir (VALTREX) 1000 MG tablet, Take 1 tablet (1,000 mg total) by mouth 3 (three) times daily., Disp: 21 tablet, Rfl: 0    Review of patient's allergies indicates:  No Known Allergies       REVIEW OF SYSTEMS:  Constitution: Negative. Negative for chills, fever and night sweats.   HENT: Negative for congestion and headaches.    Eyes: Negative for blurred vision, left vision loss and right vision loss.   Cardiovascular: Negative for chest pain and syncope.   Respiratory: Negative for cough and shortness of breath.    Endocrine: Negative for polydipsia, polyphagia and polyuria.   Hematologic/Lymphatic: Negative for bleeding problem. Does not bruise/bleed easily.   Skin: Negative for dry skin, itching and rash.   Musculoskeletal: Negative for falls.  Positive for right shoulder pain and muscle weakness.   Gastrointestinal: Negative for abdominal pain and bowel incontinence.   Genitourinary: Negative for bladder incontinence and nocturia.   Neurological: Negative for disturbances in coordination, loss of balance and seizures.   Psychiatric/Behavioral: Negative for depression. The patient does not have insomnia.    Allergic/Immunologic: Negative for hives and persistent infections.      PHYSICAL EXAMINATION:  Vitals:  /76   Pulse 78   Ht 5' 7" (1.702 m)   Wt 84.4 kg (186 lb)   BMI 29.13 kg/m²    General: The patient is alert and oriented x 3.  Mood is pleasant.  Observation of ears, eyes and nose reveal no gross abnormalities.  No labored breathing observed.  Gait is coordinated. Patient can toe walk and heel walk without difficulty.      RIGHT Shoulder / Upper Extremity Exam    OBSERVATION:     Swelling  none  Deformity  none   Discoloration  none   Scapular " winging none   Scars   none  Atrophy  none    TENDERNESS / CREPITUS (T/C):          T/C      T/C   Clavicle   -/-  SUPRAspinatus    -/-     AC Jt.   +-/-   INFRAspinatus  -/-    SC Jt.    -/-  Deltoid    -/-      G. Tuberosity  -/-  LH BICEP groove  +/-   Acromion:  -/-  Midline Neck   -/-     Scapular Spine -/-  Trapezium   -/-   SMA Scapula  -/-  GH jt. line - post  -/-     Scapulothoracic  -/-         ROM: (* = with pain)  Left shoulder   Right shoulder        AROM (PROM)   AROM (PROM)   FE    170° (175°)     160° (165°)  *   ER at 90° ABD  90°  (90°)    70°  (75°) *   IR (spine level)   T10     L5 *    STRENGTH: (* = with pain) Left shoulder   Right shoulder   SCAPTION   5/5    4/5    IR    5/5    4+/5   ER    5/5    4+/5   BICEPS   5/5    4+/5   Deltoid    5/5    5/5     SIGNS:  Painful side       NEER   +    OGEMMAS  neg    WESTON   +    SPEEDS  neg     DROP ARM   -   BELLY PRESS neg   Superior escape none    LIFT-OFF  neg   X-Body ADD    neg    MOVING VALGUS neg        STABILITY TESTING    Left shoulder   Right shoulder    Translation     Anterior  up face     up face    Posterior  up face    up face    Sulcus   < 10mm    < 10 mm     Signs   Apprehension   neg      neg       Relocation   no change     no change      Jerk test  neg     neg    EXTREMITY NEURO-VASCULAR EXAM:    Sensation grossly intact to light touch all dermatomal regions.    DTR 2+ Biceps, Triceps, BR and Negative Quianas sign   Grossly intact motor function at Elbow, Wrist and Hand   Distal pulses radial and ulnar 2+, brisk cap refill, symmetric.      NECK:  Painless FROM and spinous processes non-tender. Negative Spurlings sign.      OTHER FINDINGS:  + scapular dyskinesia    XRAYS (3/16/18):  Bones: No fracture.  No lytic or blastic lesion.    Joints: No evidence for glenohumeral dislocation.  Mild acromioclavicular arthrosis noted.    Soft tissues: Unremarkable.    MRI (3/16/18):  1. Bursal surface tear of infraspinatus  tendon.  2. Circumferential degeneration of the labrum with probable tear superiorly.  3. Mild acromioclavicular arthrosis.      ASSESSMENT:     Right shoulder pain partial thickness rotator cuff tear, SLAP tear, and AC DJD  Right shoulder adhesive capsulitis    PLAN:    1. Cortisone injection today  2. Physical therapy at [facility] location  3. Follow up 8 weeks

## 2018-05-31 NOTE — PROCEDURES
Large Joint Aspiration/Injection  Date/Time: 5/31/2018 2:31 PM  Performed by: ALETHA CABAN  Authorized by: ALETHA CABAN     Consent Done?:  Yes (Verbal)  Indications:  Pain  Procedure site marked: Yes    Timeout: Prior to procedure the correct patient, procedure, and site was verified      Location:  Shoulder  Site:  R subacromial bursa  Prep: Patient was prepped and draped in usual sterile fashion    Ultrasonic Guidance for needle placement: No  Needle size:  22 G  Approach:  Posterior  Medications:  40 mg triamcinolone acetonide 40 mg/mL  Patient tolerance:  Patient tolerated the procedure well with no immediate complications

## 2018-06-01 ENCOUNTER — CLINICAL SUPPORT (OUTPATIENT)
Dept: REHABILITATION | Facility: HOSPITAL | Age: 48
End: 2018-06-01
Attending: ORTHOPAEDIC SURGERY
Payer: COMMERCIAL

## 2018-06-01 DIAGNOSIS — M25.511 CHRONIC RIGHT SHOULDER PAIN: ICD-10-CM

## 2018-06-01 DIAGNOSIS — M25.611 DECREASED RANGE OF MOTION OF RIGHT SHOULDER: ICD-10-CM

## 2018-06-01 DIAGNOSIS — G25.89 SCAPULAR DYSKINESIS: ICD-10-CM

## 2018-06-01 DIAGNOSIS — G89.29 CHRONIC RIGHT SHOULDER PAIN: ICD-10-CM

## 2018-06-01 PROCEDURE — 97161 PT EVAL LOW COMPLEX 20 MIN: CPT | Mod: PN

## 2018-06-01 PROCEDURE — 97110 THERAPEUTIC EXERCISES: CPT | Mod: PN

## 2018-06-01 NOTE — PLAN OF CARE
"  TIME RECORD    Date: 2018    Start Time:  8:30  Stop Time:  9:30  Total Timed Minutes:  60 min      OUTPATIENT PHYSICAL THERAPY   PATIENT EVALUATION  Onset Date: 2017  Primary Diagnosis:   1. Chronic right shoulder pain     2. Decreased range of motion of right shoulder     3. Scapular dyskinesis       Treatment Diagnosis: right shoulder pain 2* to impingement and RTC tendinopathy, scapular dyskinesis, decreased joint mobility, flexibility, and ROM  Past Medical History:   Diagnosis Date    Allergy     Hypertension      Precautions: previous R ankle sprain, Tobacco use, Hx LBP  Per MD: Right shoulder pain partial thickness rotator cuff tear, SLAP tear, and AC DJD. Right shoulder adhesive capsulitis    Prior Therapy: none for c/c  Medications: Dwayne Candelario has a current medication list which includes the following prescription(s): ciprofloxacin-dexamethasone 0.3-0.1%, cyclobenzaprine, levocetirizine, losartan, naproxen, valacyclovir, and vitamin d.  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 Be able to work, throw a ball, and hold my  baby without pain or difficulty"    Subjective     Dwayne Candelario states that he started having R shoulder pain in 2017 following a lifting ROMA. Pt noted that it not go away with exercise. He had an injection on 3/29/18 and 18 which helped but it never went away. MD says that he has a torn RTC in the R shoulder. Pt denies clicking, locking, or popping in the R shoulder. Pt's c/c today is pain and weakness in the R shoulder and upper trap, worse with OH " "activities. Writes with L hand but does everything else with the R arm - sports, throwing ball    Pain:  Location: Right shoulder, R UT  Description: Aching, Dull, Tight and Variable  Activities Which Increase Pain: reaching/lifting OH, reaching behind the back, lifting/carrying at waist level, throwing, night time, hanging, push/pull activities   Activities Which Decrease Pain: cortizone injection (3/29/18 and 5/31/18), massages, ice/heat, medication  Pain Scale: 3/10 at best 3/10 now  7/10 at worst    Objective     Posture: FHP, rounded shoulders, Eladio GHJ IR  Palpation: TTP biceps LH tendon  Sensation: intact  DTRs: intact    Cervical AROM: WNL all directions    Shoulder  Right   Left  Pain/Dysfunction with Movement    AROM PROM MMT AROM PROM MMT    flexion 170* 180* 4 170 180 5 *indicates pain at acromion  MMT limited by pain   abduction 170* 180* 4 170 180 5 *indicates pain (abd > flex)  at acromion  MMT limited by pain   Internal rotation L2 40* 4 T8 70 5 MMT limited by pain  at acromion, ant shld   ER at 90° abd 90 100 NT   NT With scap stabilized - no pain.   ER at 0° abd 65* 70* 4 70 75 5 *axillary pain  MMT limited by pain     Scapular Strength:   Upper Trap 5/5  Mid Trap 4-/5  Low Trap 3+/5  Rhomboids 4-/5  Serratus Anterior 4/5    Elbow Screen: ROM WNL, strength 4/5 - limited by pain    Flexibility:   Pectoralis Major/Minor: poor  Latissimus Dorsi: good  Internal rotators: poor  External rotators: poor    Joint Mobility: 2/6 with palpable crepitus    Special Tests:   Sulcus Sign: -  Biceps Load II: NT  Compression-Grind: +  Yergasons Test: -  Apprehension Sign: -  Supraspinatus (Empty Can Test): + for pain, - for weakness  Vigil-Rasta: +  Neers: +  Speeds: + for pain, - for weakness    Other: FOTO limitation = 43% disability, FAQ = 16%, DASH = 28% disability  Examination time: 25 min    Treatment:   R SL Sleeper stretch: 5 x 10"  Seated scap squeezes: 15 x 3" holds  Supine Eladio ER: 2 x 10 x " "RTB  Supine Eladio Abd: 2 x 10 x RTB  Prone Is: 1 x 10 x 3" holds - moderate OP for scapular positioning  Prone Ts: 1 x 10 x 3" holds - heavy OP for scapular positioning    Patient education: Patient educated regarding pathogenesis, diagnosis, protocol, prognosis, POC, and HEP. Written Home Exercises Provided with written and verbal instructions for frequency and duration of the following exercises: see clinical reference tab. Pt educated on HEP and activity modifications to reduce c/o pain and improve overall function. Pt was educated in posture and body mechanics. Pt also educated on use of modalities prn to reduce c/o pain and dysfunction. Patient demo good understanding of the education provided. Patient demo good return demo of skill of exercises.      Assessment     Initial Assessment (Pertinent finding, problem list and factors affecting outcome): Patient presents with right shoulder pain with s/s associated with postural imbalance, scapular dyskinesis. Current impairments limits patient with all functional activities. Patient requires skilled PT to address remaining deficits and return patient to PLOF. Pt has set realistic goals and has verbalized good understanding and agreement with reported diagnosis, prognosis and treatment. Pt demonstrates no additional cultural, spiritual or educational need and currently has no barriers to learning.     Rehab Potiential: good     Medical necessity is demonstrated by the following problem list.  Pt presents with the following impairments:     History  Co-morbidities and personal factors that may impact the plan of care Examination  Body Structures and Functions, activity limitations and participation restrictions that may impact the plan of care Clinical Presentation   Decision Making/ Complexity Score   Co-morbidities:     previous R ankle sprain, Tobacco use, Hx LBP.  Per MD: Right shoulder pain partial thickness rotator cuff tear, SLAP tear, and AC DJD        Personal " Factors:   Age  Occupation  Lifestyle  Young children / new father  Financial considerations  Commute distance Body Regions: RUE, trunk    Body Systems: Musculoskeletal (ROM, strength, symmetry, joint mobility, soft tissue or myofascial mobility, flexibility), Neuromuscular (postural alignment, body mechanics, balance, gait, transfers, motor control, motor learning), cardiovascular (endurance), Integumentary (skin integrity)    Activity limitations:   Learning and applying knowledge  no deficits    General Tasks and Commands  no deficits    Communication  no deficits    Mobility  lifting and carrying objects  fine hand use (grasping/picking up)  driving (bike, car, motorcycle)    Self care  washing oneself (bathing, drying, washing hands)  caring for body parts (brushing teeth, shaving, grooming)  dressing  looking after one's health    Domestic Life  shopping  cooking  doing house work (cleaning house, washing dishes, laundry)  assisting others    Interactions/Relationships  family relationships    Life Areas  employment    Community and Social Life  community life  recreation and leisure  Hinduism and spirituality      Participation Restrictions:   reaching/lifting OH, reaching behind the back, lifting/carrying at waist level ( child), throwing, night time, hanging, push/pull activities, work-related activities         Stable and uncomplicated           Low            FOTO: 43% disability       Short Term Goals (4 Weeks):   1. Pt to demonstrate improved Active IR by 10% to allow pt to perform self care activities with increased ease.  2. Pt to demonstrate improved flexibility by a half grade to allow improved ADLs with increased ease.   3. Pt will report <3/10 pain within the right shoulder for ease with dressing/grooming  4. Pt will report being independent with his/her HEP for maintenance of improvements gained during therapy sessions  5. Pt to demonstrate improved functional ability with FOTO limitation  <=33% disability.    Long Term Goals (20 Weeks):   1. Pt to demonstrate improved ROM to WNL, R=L, to allow pt to perform self care with increased ease.  2. Pt to demonstrate improved flexibility by a full grade to allow improved postural alignment with increased ease.  3. Pt will demonstrate >=4+/5 strength within the right shoulder and scapula for ease with house hold chores  4. Pt to demonstrate improved functional ability with FOTO limitation <=23% disability.  5. Pt independent with HEP and demonstrates good return technique.      Plan     Certification Period: 6/1/18 to 9/1/18  Recommended Treatment Plan: 1-2 times per week for 6-8 weeks, pending pt progression: Electrical Stimulation prn, Iontophoresis (with dexamethasone prn), Manual Therapy, Moist Heat/ Ice, Neuromuscular Re-ed, Patient Education, Self Care, Therapeutic Activites, Therapeutic Exercise and Other IASTYM, therapeutic taping, dry needling, aquatic therapy  Other Recommendations: Progress HEP towards D/C. Recommend F/U with MD if symptoms worsen or do not resolve. Patient may be seen by a PTA for treatment to carry out their plan of care.  Face-to-face conferences will be held.          Therapist: Maral Renee, PT    I CERTIFY THE NEED FOR THESE SERVICES FURNISHED UNDER THIS PLAN OF TREATMENT AND WHILE UNDER MY CARE    Physician's comments: ________________________________________________________________________________________________________________________________________________      Physician's Name: ___________________________________

## 2018-06-15 ENCOUNTER — CLINICAL SUPPORT (OUTPATIENT)
Dept: REHABILITATION | Facility: HOSPITAL | Age: 48
End: 2018-06-15
Attending: ORTHOPAEDIC SURGERY
Payer: COMMERCIAL

## 2018-06-15 DIAGNOSIS — M25.611 DECREASED RANGE OF MOTION OF RIGHT SHOULDER: ICD-10-CM

## 2018-06-15 DIAGNOSIS — M25.511 CHRONIC RIGHT SHOULDER PAIN: ICD-10-CM

## 2018-06-15 DIAGNOSIS — G25.89 SCAPULAR DYSKINESIS: ICD-10-CM

## 2018-06-15 DIAGNOSIS — G89.29 CHRONIC RIGHT SHOULDER PAIN: ICD-10-CM

## 2018-06-15 PROCEDURE — 97110 THERAPEUTIC EXERCISES: CPT | Mod: PN

## 2018-06-15 NOTE — PROGRESS NOTES
"                                                    Physical Therapy Daily Note     Name: Dwayne Candelario  [facility] Number: [id number]  Diagnosis:   Encounter Diagnoses   Name Primary?    Chronic right shoulder pain     Decreased range of motion of right shoulder     Scapular dyskinesis      Physician: Solitario Shea MD  Precautions: previous R ankle sprain, Tobacco use, Hx LBP  Visit #: 2 of 20  PTA Visit #: 0  Time In: 8:37  Time Out: 9:30  Total treatment time: 53 min (1:1 53 min)    Subjective     Pt reports: intermittent compliance with HEP "every other day." Pt reports 1 flare up of shoulder pain "a few days after the first session, but it only hurt that one time and never came back." c/c today is posterior shoulder pain down the L humerus.  Pain Scale: Dwayne rates pain on a scale of 0-10 to be 3 currently.    Objective     Dwayne received individual therapeutic exercises to develop strength, endurance, ROM, flexibility, posture and core stabilization for 53 minutes including:  Standing UBE: 3'/3' minutes (collected history, assessed pt complaints, education on causes of shoulder pain; importance of exercise)  R SL Sleeper stretch: 5 x 10"  Seated scap squeezes: 20 x 3" holds  Supine Eladio ER: 2 x 10 x RTB  Supine Eladio Abd: 2 x 10 x RTB  Prone Is: 2 x 10 x 3" holds - moderate OP for scapular positioning  Prone Ts: 2 x 10 x 3" holds - moderate OP for scapular positioning  +SA punches: add next visit  +SL ER: 2 x 10  +SL flex to 90: 1 x 10    +Narrow rows:  add next visit  +SALPD:  add next visit  +LT pull down:  add next visit    Written Home Exercises Provided: none today. Reviewed from IE.  Pt demo good understanding of the education provided. Dwayne demonstrated good return demonstration of activities.     Education provided re:  Dwayne verbalized good understanding of education provided. No spiritual or educational barriers to learning provided    Assessment     Patient tolerated overall treatment well. Pt demo's " poor recall of home exercises and poor technique with scapular activation, indicating poor compliance at home. New *exercises added to promote scapular activation and NM coordination. Pt required Mod VC/TC for scapular positioning during therex, but self reported increased fatigue in the RTC with correct positioning. Pt continues to lack scapular strength and stability necessary for dynamic stability with ADLs.  This is a 47 y.o. male referred to outpatient physical therapy and presents with a medical diagnosis of shoulder pain and demonstrates limitations as described in the problem list. Pt prognosis is Good. Pt will continue to benefit from skilled outpatient physical therapy to address the deficits listed in the problem list, provide pt/family education and to maximize pt's level of independence in the home and community environment.     Goals as follows: See IE on 6/1/18 (PN due by 7/1/18)     Plan     Continue with established Plan of Care towards PT goals.    Therapist: Maral Renee, PT  6/15/2018

## 2018-06-28 ENCOUNTER — DOCUMENTATION ONLY (OUTPATIENT)
Dept: REHABILITATION | Facility: HOSPITAL | Age: 48
End: 2018-06-28

## 2018-06-28 NOTE — PROGRESS NOTES
Patient called at 8:30 to state that he would be 20 mins late for his 8:30 PT appointment. Patient did not arrive to session and is considered a no show today. Patient's next scheduled appointment is Friday, 6/29/2018.    No show: 2  Cancel: 1     Therapist: Mary De La Vega, PTA  06/28/2018

## 2018-06-29 ENCOUNTER — CLINICAL SUPPORT (OUTPATIENT)
Dept: REHABILITATION | Facility: HOSPITAL | Age: 48
End: 2018-06-29
Attending: ORTHOPAEDIC SURGERY
Payer: COMMERCIAL

## 2018-06-29 DIAGNOSIS — G89.29 CHRONIC RIGHT SHOULDER PAIN: Primary | ICD-10-CM

## 2018-06-29 DIAGNOSIS — M25.611 DECREASED RANGE OF MOTION OF RIGHT SHOULDER: ICD-10-CM

## 2018-06-29 DIAGNOSIS — M25.511 CHRONIC RIGHT SHOULDER PAIN: Primary | ICD-10-CM

## 2018-06-29 DIAGNOSIS — G25.89 SCAPULAR DYSKINESIS: ICD-10-CM

## 2018-06-29 PROCEDURE — 97110 THERAPEUTIC EXERCISES: CPT | Mod: PN

## 2018-06-29 NOTE — PROGRESS NOTES
"                                                    Physical Therapy Daily Note     Name: Dwayne Candelario  [facility] Number: [id number]  Diagnosis:   Encounter Diagnoses   Name Primary?    Chronic right shoulder pain Yes    Decreased range of motion of right shoulder     Scapular dyskinesis      Physician: Solitario Shea MD  Precautions: previous R ankle sprain, Tobacco use, Hx LBP  Visit #:  3 of 20  PTA Visit #: 1  Time In: 0845  Time Out: 0930  Total treatment time: 45'  min (1:1 25 min)    Subjective     Pt reports: that his shoulder is feeling much better.    Pain Scale: Dwayne rates pain on a scale of 0-10 to be 4 currently.    Objective     Dwayne received individual therapeutic exercises to develop strength, endurance, ROM, flexibility, posture and core stabilization for 45 minutes including:    Standing UBE: 3'/3' minutes   R SL Sleeper stretch: 5 x 15"  Seated scap squeezes: 20 x 3" holds  Supine Eladio ER: 2 x 10 x GTB  Supine Eladio Abd: 2 x 10 x GTB  + Prone Rows 2 x 10 x 3'' hold   Prone Is: 2 x 10 x 3" holds  Prone Ts: 2 x 10 x 3" holds   SL SA punches: 2 x 10   SL ER: 2 x 10  SL flex to 90: 2 x 10  + Supine circles CW/CCW x 20   + Supine ABCs x 1       Narrow rows:  2 x 10 x YTB ( machine next session)   SALPD:  2 x 10 YTB  ( machine next session )   LT pull down on FM 2 x 10 x 7#    Ice pack x 00<--- pt declined     Written Home Exercises Provided: none today. Reviewed from IE.  Pt demo good understanding of the education provided. Dwayne demonstrated good return demonstration of activities.     Education provided re:  Dwayne verbalized good understanding of education provided. No spiritual or educational barriers to learning provided    Assessment     Patient tolerated treatment session well today.  Patient with good tolerance to exercises with appropriate training effect achieved.  Patient requires minimal cueing for scapular positioning to maintain good form with side lying serratus punches.  Patient will " benefit from continued skilled therapy to progress towards discharge goals.     This is a 48 y.o. male referred to outpatient physical therapy and presents with a medical diagnosis of shoulder pain and demonstrates limitations as described in the problem list. Pt prognosis is Good. Pt will continue to benefit from skilled outpatient physical therapy to address the deficits listed in the problem list, provide pt/family education and to maximize pt's level of independence in the home and community environment.     Goals as follows: See IE on 6/1/18 (PN due by 7/1/18)     Plan     Continue with established Plan of Care towards PT goals.    Therapist: Sara Hurtado, PTA  6/29/2018

## 2018-07-05 ENCOUNTER — CLINICAL SUPPORT (OUTPATIENT)
Dept: REHABILITATION | Facility: HOSPITAL | Age: 48
End: 2018-07-05
Attending: ORTHOPAEDIC SURGERY
Payer: COMMERCIAL

## 2018-07-05 DIAGNOSIS — G89.29 CHRONIC RIGHT SHOULDER PAIN: ICD-10-CM

## 2018-07-05 DIAGNOSIS — M25.611 DECREASED RANGE OF MOTION OF RIGHT SHOULDER: ICD-10-CM

## 2018-07-05 DIAGNOSIS — M25.511 CHRONIC RIGHT SHOULDER PAIN: ICD-10-CM

## 2018-07-05 DIAGNOSIS — G25.89 SCAPULAR DYSKINESIS: ICD-10-CM

## 2018-07-05 PROCEDURE — 97110 THERAPEUTIC EXERCISES: CPT | Mod: PN

## 2018-07-05 NOTE — PROGRESS NOTES
"                                                    Physical Therapy Daily Note     Name: Dwayne Candelario  Clinic Number: [id number]  Diagnosis:   Encounter Diagnoses   Name Primary?    Chronic right shoulder pain     Decreased range of motion of right shoulder     Scapular dyskinesis      Physician: Solitario Shea MD  Precautions: previous R ankle sprain, Tobacco use, Hx LBP  Visit #:  4 of 20  PTA Visit #: 1  Time In: 8:48 - 18 min late for appt  Time Out: 9:30  Total treatment time: 42'  min (1:1 42 min)    Subjective     Pt reports: that his shoulder is feeling better and that he is 70% better since he started therapy. "I'm going to be honest with you and that I don't do my stretches every day. I might do my exercises 3x per week." Pt reports continued pain with sleeping on that side and reaching OH, lifting OH. He says that he does most things with his L hand because he is L hand dominant. "I pitch with my right hand."  Pain Scale: Dwayne rates pain on a scale of 0-10 to be 1 currently.    Objective     Shoulder AROM:   Flexion = WNL, c/o dull, ache at endrange  Abduction = WNL, c/o dull, ache at endrange  ER = 70/75, no pain  IR = L2 - c/o dull ache at endrange, no change since IE    Shoulder MMT: grossly 4/5 - no change since IE  Scapular Strength:   Upper Trap 5/5  Mid Trap 4-/5  Low Trap 3+/5  Rhomboids 4-/5  Serratus Anterior 4/5     Elbow Screen: ROM WNL, strength 4/5 - limited by pain     Flexibility:   Pectoralis Major/Minor: poor - no change  Internal rotators: fair-  External rotators: fair-     Joint Mobility: 2/6 with palpable crepitus     Special Tests:   Compression-Grind: -  Supraspinatus (Empty Can Test): + for pain, - for weakness  Yaritza-Rasta: +  Neers: +  Speeds: + for pain, - for weakness     Other: FOTO limitation = 33% disability, FAQ = 37%, DASH = 14% disability      Dwayne received individual therapeutic exercises to develop strength, endurance, ROM, flexibility, posture and core " "stabilization for 42 minutes including:    Standing UBE: 3'/3' minutes   R SL Sleeper stretch: 5 x 15"  +Strap IR stretch: 5 x 10"  +1/2 foam pec stretch: add next visit  +1/2 foam series: add next visit    Seated scap squeezes: 20 x 3" holds  Supine Eladio ER: 2 x 10 x GTB  Supine Eladio Abd: 2 x 10 x GTB  Prone Rows 2 x 10 x 3'' hold   Prone Is: 2 x 10 x 3" holds  Prone Ts: 2 x 10 x 3" holds   SL SA punches: 2 x 10   SL ER: 2 x 10  SL flex to 90: 2 x 10  Supine circles CW/CCW x 20   Supine ABCs x 1       Narrow rows:  2 x 10 x YTB ( machine next session)   SALPD:  2 x 10 YTB  ( machine next session )   LT pull down on FM 2 x 10 x 7#    Ice pack x 00<--- pt declined     Written Home Exercises Provided: none today. Reviewed from IE.  Pt demo good understanding of the education provided. Dwayne demonstrated good return demonstration of activities.     Education provided re:  Dwayne verbalized good understanding of education provided. No spiritual or educational barriers to learning provided    Assessment     Limited progression of therex today 2* to pt's tardiness to appointment. Pt demo's improved ROM with minimal improvements in flexibility and strength. Pt continues to sit with FHP and rounded shoulders, requiring VC for upright posture, indicating continued lack of postural awareness and endurance. Limitations in joint mobility, shoulder and trunk flexibility, as well as scapular and RTC strength continue to limit dynamic scapulohumeral rhythm and stability with ADLs. Poor compliance with therapy attendance and fair compliance with HEP continues to limit pt with further progression towards therex.    This is a 48 y.o. male referred to outpatient physical therapy and presents with a medical diagnosis of shoulder pain and demonstrates limitations as described in the problem list. Pt prognosis is Good. Pt will continue to benefit from skilled outpatient physical therapy to address the deficits listed in the problem list, " provide pt/family education and to maximize pt's level of independence in the home and community environment.     Goals as follows: See PN on 7/5/18 (PN due by 8/5/18)  Short Term Goals (4 Weeks):   1. Pt to demonstrate improved Active IR by 10% to allow pt to perform self care activities with increased ease. - Not Met. In progress  2. Pt to demonstrate improved flexibility by a half grade to allow improved ADLs with increased ease.  - Not Met. In progress  3. Pt will report <3/10 pain within the right shoulder for ease with dressing/grooming - Not Met. In progress  4. Pt will report being independent with his/her HEP for maintenance of improvements gained during therapy sessions - Not Met. In progress  5. Pt to demonstrate improved functional ability with FOTO limitation <=33% disability. - Met     Long Term Goals (20 Weeks):   1. Pt to demonstrate improved ROM to WNL, R=L, to allow pt to perform self care with increased ease. - Not Met. In progress  2. Pt to demonstrate improved flexibility by a full grade to allow improved postural alignment with increased ease. - Not Met. In progress  3. Pt will demonstrate >=4+/5 strength within the right shoulder and scapula for ease with house hold chores - Not Met. In progress  4. Pt to demonstrate improved functional ability with FOTO limitation <=23% disability. - Not Met  5. Pt independent with HEP and demonstrates good return technique. -  - Not Met. In progress     Plan     Continue with established Plan of Care towards PT goals.    Therapist: Maral Renee, PT  7/5/2018

## 2018-07-13 ENCOUNTER — CLINICAL SUPPORT (OUTPATIENT)
Dept: REHABILITATION | Facility: HOSPITAL | Age: 48
End: 2018-07-13
Attending: ORTHOPAEDIC SURGERY
Payer: COMMERCIAL

## 2018-07-13 DIAGNOSIS — G89.29 CHRONIC RIGHT SHOULDER PAIN: ICD-10-CM

## 2018-07-13 DIAGNOSIS — M25.511 CHRONIC RIGHT SHOULDER PAIN: ICD-10-CM

## 2018-07-13 DIAGNOSIS — G25.89 SCAPULAR DYSKINESIS: ICD-10-CM

## 2018-07-13 DIAGNOSIS — M25.611 DECREASED RANGE OF MOTION OF RIGHT SHOULDER: ICD-10-CM

## 2018-07-13 PROCEDURE — 97110 THERAPEUTIC EXERCISES: CPT | Mod: PN

## 2018-07-13 NOTE — PROGRESS NOTES
"                                                    Physical Therapy Daily Note     Name: Dwayne Candelario  [facility] Number: [id number]  Diagnosis:   Encounter Diagnoses   Name Primary?    Chronic right shoulder pain     Decreased range of motion of right shoulder     Scapular dyskinesis      Physician: Solitario Shea MD  Precautions: previous R ankle sprain, Tobacco use, Hx LBP  Visit #:  4 of 20  PTA Visit #: 1  Time In: 8:39   Time Out: 9:30  Total treatment time: 51'  min (1:1 30 min)    Subjective     Pt reports: he is pain free today. Pt states that he had an episode of "headache-like" pain in his shoulder a few days ago which lasted for a dew hours.    Pain Scale: Dwayne rates pain on a scale of 0-10 to be 1 currently.    Objective     Dwayne received individual therapeutic exercises to develop strength, endurance, ROM, flexibility, posture and core stabilization for 51 minutes including:    Standing UBE: 2'/2' minutes   R SL Sleeper stretch: 5 x 15"  Strap IR stretch: 5 x 10"  +1/2 foam pec stretch: 2 min  +1/2 foam series (SA punches, OH flex): 20 x 3# ea    Seated scap squeezes: 20 x 3" holds  Supine Eladio ER: 2 x 10 x GTB  Supine Eladio Abd: 2 x 10 x GTB  Prone Rows 2 x 10 x 3'' hold   Prone Is: 2 x 10 x 3" holds  Prone Ts: 2 x 10 x 3" holds   SL SA punches: 2 x 10   SL ER: 2 x 10  SL flex to 90: 2 x 10  Supine circles CW/CCW x 20   Supine ABCs x 1     +Standing ER: 2 x 10 x RTB (towel under elbow)  Narrow rows:  2 x 10 x YTB ( machine next session)   SALPD:  2 x 10 YTB  ( machine next session )   LT pull down on FM 2 x 10 x 7#    Ice pack x 00<--- pt declined     Written Home Exercises Provided: none today. Reviewed from IE.  Pt demo good understanding of the education provided. Dwayne demonstrated good return demonstration of activities.     Education provided re:  Dwayne verbalized good understanding of education provided. No spiritual or educational barriers to learning provided    Assessment     New exercises " added to promote pec flexibility, TSP joint mobility to allow for improved ROM. Encouraged pt to continue with stretching daily to promote painfree ROM necessary for ADLs. Resisted ER added to improve RTC strength as well as GHJ stability during OH reaching.    This is a 48 y.o. male referred to outpatient physical therapy and presents with a medical diagnosis of shoulder pain and demonstrates limitations as described in the problem list. Pt prognosis is Good. Pt will continue to benefit from skilled outpatient physical therapy to address the deficits listed in the problem list, provide pt/family education and to maximize pt's level of independence in the home and community environment.     Goals as follows: See PN on 7/5/18 (PN due by 8/5/18)  Short Term Goals (4 Weeks):   1. Pt to demonstrate improved Active IR by 10% to allow pt to perform self care activities with increased ease. - Not Met. In progress  2. Pt to demonstrate improved flexibility by a half grade to allow improved ADLs with increased ease.  - Not Met. In progress  3. Pt will report <3/10 pain within the right shoulder for ease with dressing/grooming - Not Met. In progress  4. Pt will report being independent with his/her HEP for maintenance of improvements gained during therapy sessions - Not Met. In progress  5. Pt to demonstrate improved functional ability with FOTO limitation <=33% disability. - Met     Long Term Goals (20 Weeks):   1. Pt to demonstrate improved ROM to WNL, R=L, to allow pt to perform self care with increased ease. - Not Met. In progress  2. Pt to demonstrate improved flexibility by a full grade to allow improved postural alignment with increased ease. - Not Met. In progress  3. Pt will demonstrate >=4+/5 strength within the right shoulder and scapula for ease with house hold chores - Not Met. In progress  4. Pt to demonstrate improved functional ability with FOTO limitation <=23% disability. - Not Met  5. Pt independent with  HEP and demonstrates good return technique. -  - Not Met. In progress     Plan     Continue with established Plan of Care towards PT goals.    Therapist: Maral Renee, PT  7/13/2018

## 2019-10-03 ENCOUNTER — HOSPITAL ENCOUNTER (EMERGENCY)
Facility: HOSPITAL | Age: 49
Discharge: HOME | End: 2019-10-03
Attending: EMERGENCY MEDICINE
Payer: COMMERCIAL

## 2019-10-03 VITALS
SYSTOLIC BLOOD PRESSURE: 149 MMHG | OXYGEN SATURATION: 97 % | HEIGHT: 66 IN | DIASTOLIC BLOOD PRESSURE: 70 MMHG | RESPIRATION RATE: 21 BRPM | HEART RATE: 119 BPM | BODY MASS INDEX: 28.93 KG/M2 | WEIGHT: 180 LBS | TEMPERATURE: 100 F

## 2019-10-03 DIAGNOSIS — S62.324A CLOSED DISPLACED FRACTURE OF SHAFT OF FOURTH METACARPAL BONE OF RIGHT HAND, INITIAL ENCOUNTER: Primary | ICD-10-CM

## 2019-10-03 PROCEDURE — 99283 EMERGENCY DEPT VISIT LOW MDM: CPT | Mod: 25

## 2019-10-03 PROCEDURE — 25000003 PHARM REV CODE 250: Performed by: EMERGENCY MEDICINE

## 2019-10-03 PROCEDURE — 29125 APPL SHORT ARM SPLINT STATIC: CPT | Mod: RT

## 2019-10-03 RX ORDER — HYDROCODONE BITARTRATE AND ACETAMINOPHEN 5; 325 MG/1; MG/1
1 TABLET ORAL EVERY 6 HOURS PRN
Qty: 10 TABLET | Refills: 0 | Status: SHIPPED | OUTPATIENT
Start: 2019-10-03

## 2019-10-03 RX ORDER — HYDROCODONE BITARTRATE AND ACETAMINOPHEN 5; 325 MG/1; MG/1
1 TABLET ORAL
Status: COMPLETED | OUTPATIENT
Start: 2019-10-03 | End: 2019-10-03

## 2019-10-03 RX ADMIN — HYDROCODONE BITARTRATE AND ACETAMINOPHEN 1 TABLET: 5; 325 TABLET ORAL at 09:10

## 2019-10-04 ENCOUNTER — OFFICE VISIT (OUTPATIENT)
Dept: ORTHOPEDICS | Facility: CLINIC | Age: 49
End: 2019-10-04
Payer: COMMERCIAL

## 2019-10-04 VITALS
BODY MASS INDEX: 28.93 KG/M2 | HEART RATE: 80 BPM | HEIGHT: 66 IN | SYSTOLIC BLOOD PRESSURE: 121 MMHG | DIASTOLIC BLOOD PRESSURE: 71 MMHG | WEIGHT: 180 LBS

## 2019-10-04 DIAGNOSIS — M79.641 RIGHT HAND PAIN: Primary | ICD-10-CM

## 2019-10-04 PROCEDURE — 99214 PR OFFICE/OUTPT VISIT, EST, LEVL IV, 30-39 MIN: ICD-10-PCS | Mod: S$GLB,,, | Performed by: ORTHOPAEDIC SURGERY

## 2019-10-04 PROCEDURE — 3078F DIAST BP <80 MM HG: CPT | Mod: CPTII,S$GLB,, | Performed by: ORTHOPAEDIC SURGERY

## 2019-10-04 PROCEDURE — 99999 PR PBB SHADOW E&M-EST. PATIENT-LVL III: CPT | Mod: PBBFAC,,, | Performed by: ORTHOPAEDIC SURGERY

## 2019-10-04 PROCEDURE — 3078F PR MOST RECENT DIASTOLIC BLOOD PRESSURE < 80 MM HG: ICD-10-PCS | Mod: CPTII,S$GLB,, | Performed by: ORTHOPAEDIC SURGERY

## 2019-10-04 PROCEDURE — 3074F SYST BP LT 130 MM HG: CPT | Mod: CPTII,S$GLB,, | Performed by: ORTHOPAEDIC SURGERY

## 2019-10-04 PROCEDURE — 99214 OFFICE O/P EST MOD 30 MIN: CPT | Mod: S$GLB,,, | Performed by: ORTHOPAEDIC SURGERY

## 2019-10-04 PROCEDURE — 99999 PR PBB SHADOW E&M-EST. PATIENT-LVL III: ICD-10-PCS | Mod: PBBFAC,,, | Performed by: ORTHOPAEDIC SURGERY

## 2019-10-04 PROCEDURE — 3008F BODY MASS INDEX DOCD: CPT | Mod: CPTII,S$GLB,, | Performed by: ORTHOPAEDIC SURGERY

## 2019-10-04 PROCEDURE — 3074F PR MOST RECENT SYSTOLIC BLOOD PRESSURE < 130 MM HG: ICD-10-PCS | Mod: CPTII,S$GLB,, | Performed by: ORTHOPAEDIC SURGERY

## 2019-10-04 PROCEDURE — 3008F PR BODY MASS INDEX (BMI) DOCUMENTED: ICD-10-PCS | Mod: CPTII,S$GLB,, | Performed by: ORTHOPAEDIC SURGERY

## 2019-10-04 NOTE — ED PROVIDER NOTES
Encounter Date: 10/3/2019    SCRIBE #1 NOTE: I, Martha Ojeda, am scribing for, and in the presence of, Jose L Smith MD.       History     Chief Complaint   Patient presents with    Hand Injury     Slipped off ladder 1 hour pta, landed in right hand. Pain and swelling noted       Time seen by provider: 9:39 PM on 10/03/2019    Dwayne Candelario is a 49 y.o. male who presents to the ED with an onset of pain and swelling of the right hand s/p bracing a fall after slipping from an attic ladder. The patient denies hitting head or any other symptoms at this time. No pertinent PMHx. No PSHx. No known drug allergies.      The history is provided by the patient.     Review of patient's allergies indicates:  No Known Allergies  Past Medical History:   Diagnosis Date    Allergy     Hypertension      No past surgical history on file.  Family History   Problem Relation Age of Onset    Cancer Father         colon    No Known Problems Mother     No Known Problems Sister     No Known Problems Brother     No Known Problems Maternal Aunt     No Known Problems Maternal Uncle     No Known Problems Paternal Aunt     No Known Problems Paternal Uncle     No Known Problems Maternal Grandmother     No Known Problems Maternal Grandfather     No Known Problems Paternal Grandmother     No Known Problems Paternal Grandfather     Amblyopia Neg Hx     Blindness Neg Hx     Cataracts Neg Hx     Diabetes Neg Hx     Glaucoma Neg Hx     Hypertension Neg Hx     Macular degeneration Neg Hx     Retinal detachment Neg Hx     Strabismus Neg Hx     Stroke Neg Hx     Thyroid disease Neg Hx      Social History     Tobacco Use    Smoking status: Never Smoker    Smokeless tobacco: Never Used   Substance Use Topics    Alcohol use: No     Comment: occasional    Drug use: No     Review of Systems   Constitutional: Negative for fever.   HENT: Negative for sore throat.    Respiratory: Negative for shortness of breath.     Cardiovascular: Negative for chest pain.   Gastrointestinal: Negative for nausea.   Genitourinary: Negative for dysuria.   Musculoskeletal: Positive for arthralgias (right hand). Negative for back pain.        + Edema; right hand   Skin: Negative for rash.   Neurological: Negative for weakness.   Hematological: Does not bruise/bleed easily.       Physical Exam     Initial Vitals [10/03/19 2020]   BP Pulse Resp Temp SpO2   (!) 149/70 (!) 119 (!) 21 99.5 °F (37.5 °C) 97 %      MAP       --         Physical Exam    Nursing note and vitals reviewed.  Constitutional: He appears well-developed and well-nourished. He is not diaphoretic. No distress.   HENT:   Head: Normocephalic and atraumatic.   Eyes: EOM are normal. Pupils are equal, round, and reactive to light.   Neck: Normal range of motion. Neck supple.   Cardiovascular: Normal rate, regular rhythm, normal heart sounds and intact distal pulses. Exam reveals no gallop and no friction rub.    No murmur heard.  Pulmonary/Chest: Breath sounds normal. No respiratory distress. He has no wheezes. He has no rhonchi. He has no rales.   Abdominal: Soft. Bowel sounds are normal. There is no tenderness. There is no rebound and no guarding.   Musculoskeletal: Normal range of motion.   Tenderness and edema to the dorsum of the right hand.   Neurological: He is alert and oriented to person, place, and time.   Skin: Skin is warm.   Psychiatric: He has a normal mood and affect. His behavior is normal. Judgment and thought content normal.         ED Course   Procedures  Labs Reviewed - No data to display       Imaging Results          X-Ray Hand 3 view Right (In process)                  Medical Decision Making:   History:   Old Medical Records: I decided to obtain old medical records.  Initial Assessment:   49-year-old male presents with a chief complaint of a hand injury.  Differential Diagnosis:     Initial differential diagnosis included but not limited to fracture, dislocation,  and contusion  Independently Interpreted Test(s):   I have ordered and independently interpreted X-rays - see prior notes.  Clinical Tests:   Radiological Study: Ordered and Reviewed  ED Management:  The patient was urgently evaluated in the emergency department, his evaluation was significant for a middle-age male with tenderness and swelling to the right hand.  The patient has a fracture of the 4th metacarpal per my independent interpretation.  The patient was treated with p.o. Norco here in the emergency department.  He was placed in an ulnar gutter splint by the nursing staff and tolerated the procedure well. He is stable for discharge to home.  He will be discharged home with p.o. Hernshaw and he is referred to Orthopedics as an outpatient for further care.            Scribe Attestation:   Scribe #1: I performed the above scribed service and the documentation accurately describes the services I performed. I attest to the accuracy of the note.           I, Dr. Jose L Smith, personally performed the services described in this documentation. All medical record entries made by the scribe were at my direction and in my presence.  I have reviewed the chart and agree that the record reflects my personal performance and is accurate and complete. Jose L Smith MD.  10:14 PM 10/03/2019       Clinical Impression:       ICD-10-CM ICD-9-CM   1. Closed displaced fracture of shaft of fourth metacarpal bone of right hand, initial encounter S62.324A 815.03         Disposition:   Disposition: Discharged  Condition: Stable                        Jose L Smith MD  10/03/19 2216

## 2019-10-11 DIAGNOSIS — M79.641 RIGHT HAND PAIN: Primary | ICD-10-CM

## 2019-10-14 ENCOUNTER — HOSPITAL ENCOUNTER (OUTPATIENT)
Dept: RADIOLOGY | Facility: HOSPITAL | Age: 49
Discharge: HOME | End: 2019-10-14
Attending: ORTHOPAEDIC SURGERY
Payer: COMMERCIAL

## 2019-10-14 ENCOUNTER — OFFICE VISIT (OUTPATIENT)
Dept: ORTHOPEDICS | Facility: CLINIC | Age: 49
End: 2019-10-14
Payer: COMMERCIAL

## 2019-10-14 VITALS
HEART RATE: 56 BPM | BODY MASS INDEX: 28.91 KG/M2 | HEIGHT: 66 IN | SYSTOLIC BLOOD PRESSURE: 134 MMHG | WEIGHT: 179.88 LBS | DIASTOLIC BLOOD PRESSURE: 85 MMHG

## 2019-10-14 DIAGNOSIS — M79.641 RIGHT HAND PAIN: Primary | ICD-10-CM

## 2019-10-14 DIAGNOSIS — M79.641 RIGHT HAND PAIN: ICD-10-CM

## 2019-10-14 PROCEDURE — 3079F PR MOST RECENT DIASTOLIC BLOOD PRESSURE 80-89 MM HG: ICD-10-PCS | Mod: CPTII,S$GLB,, | Performed by: ORTHOPAEDIC SURGERY

## 2019-10-14 PROCEDURE — 73130 X-RAY EXAM OF HAND: CPT | Mod: TC,PN,RT

## 2019-10-14 PROCEDURE — 3008F BODY MASS INDEX DOCD: CPT | Mod: CPTII,S$GLB,, | Performed by: ORTHOPAEDIC SURGERY

## 2019-10-14 PROCEDURE — 99213 OFFICE O/P EST LOW 20 MIN: CPT | Mod: S$GLB,,, | Performed by: ORTHOPAEDIC SURGERY

## 2019-10-14 PROCEDURE — 73130 XR HAND COMPLETE 3 VIEW RIGHT: ICD-10-PCS | Mod: 26,RT,, | Performed by: RADIOLOGY

## 2019-10-14 PROCEDURE — 3079F DIAST BP 80-89 MM HG: CPT | Mod: CPTII,S$GLB,, | Performed by: ORTHOPAEDIC SURGERY

## 2019-10-14 PROCEDURE — 99213 PR OFFICE/OUTPT VISIT, EST, LEVL III, 20-29 MIN: ICD-10-PCS | Mod: S$GLB,,, | Performed by: ORTHOPAEDIC SURGERY

## 2019-10-14 PROCEDURE — 3008F PR BODY MASS INDEX (BMI) DOCUMENTED: ICD-10-PCS | Mod: CPTII,S$GLB,, | Performed by: ORTHOPAEDIC SURGERY

## 2019-10-14 PROCEDURE — 99999 PR PBB SHADOW E&M-EST. PATIENT-LVL III: ICD-10-PCS | Mod: PBBFAC,,, | Performed by: ORTHOPAEDIC SURGERY

## 2019-10-14 PROCEDURE — 73130 X-RAY EXAM OF HAND: CPT | Mod: 26,RT,, | Performed by: RADIOLOGY

## 2019-10-14 PROCEDURE — 3075F SYST BP GE 130 - 139MM HG: CPT | Mod: CPTII,S$GLB,, | Performed by: ORTHOPAEDIC SURGERY

## 2019-10-14 PROCEDURE — 99999 PR PBB SHADOW E&M-EST. PATIENT-LVL III: CPT | Mod: PBBFAC,,, | Performed by: ORTHOPAEDIC SURGERY

## 2019-10-14 PROCEDURE — 3075F PR MOST RECENT SYSTOLIC BLOOD PRESS GE 130-139MM HG: ICD-10-PCS | Mod: CPTII,S$GLB,, | Performed by: ORTHOPAEDIC SURGERY

## 2019-10-16 ENCOUNTER — TELEPHONE (OUTPATIENT)
Dept: ORTHOPEDICS | Facility: CLINIC | Age: 49
End: 2019-10-16

## 2019-10-16 NOTE — TELEPHONE ENCOUNTER
Spoke to patient. Advised paperwork is ready to be picked up, will be placed at the . Verbalized understanding.

## 2019-10-16 NOTE — TELEPHONE ENCOUNTER
----- Message from Hua Torres sent at 10/16/2019 10:07 AM CDT -----  Contact: pt  Paperwork, [phone]

## 2019-10-16 NOTE — PROGRESS NOTES
Past Medical History:   Diagnosis Date    Allergy     Hypertension        History reviewed. No pertinent surgical history.    Current Outpatient Medications   Medication Sig    cyclobenzaprine (FLEXERIL) 10 MG tablet Take 1 tablet (10 mg total) by mouth every evening.    HYDROcodone-acetaminophen (NORCO) 5-325 mg per tablet Take 1 tablet by mouth every 6 (six) hours as needed for Pain.    losartan (COZAAR) 50 MG tablet Take 50 mg by mouth once daily.    naproxen (NAPROSYN) 500 MG tablet Take 1 tablet (500 mg total) by mouth 2 (two) times daily.    vitamin D 1000 units Tab Take 185 mg by mouth once daily.    ciprofloxacin-dexamethasone 0.3-0.1% (CIPRODEX) 0.3-0.1 % DrpS Place 4 drops into both ears 2 (two) times daily. (Patient not taking: Reported on 10/14/2019)    levocetirizine (XYZAL) 5 MG tablet Take 1 tablet (5 mg total) by mouth every evening.    valacyclovir (VALTREX) 1000 MG tablet Take 1 tablet (1,000 mg total) by mouth 3 (three) times daily.     No current facility-administered medications for this visit.        Review of patient's allergies indicates:  No Known Allergies    Family History   Problem Relation Age of Onset    Cancer Father         colon    No Known Problems Mother     No Known Problems Sister     No Known Problems Brother     No Known Problems Maternal Aunt     No Known Problems Maternal Uncle     No Known Problems Paternal Aunt     No Known Problems Paternal Uncle     No Known Problems Maternal Grandmother     No Known Problems Maternal Grandfather     No Known Problems Paternal Grandmother     No Known Problems Paternal Grandfather     Amblyopia Neg Hx     Blindness Neg Hx     Cataracts Neg Hx     Diabetes Neg Hx     Glaucoma Neg Hx     Hypertension Neg Hx     Macular degeneration Neg Hx     Retinal detachment Neg Hx     Strabismus Neg Hx     Stroke Neg Hx     Thyroid disease Neg Hx        Social History     Socioeconomic History    Marital status:       Spouse name: Not on file    Number of children: Not on file    Years of education: Not on file    Highest education level: Not on file   Occupational History    Not on file   Social Needs    Financial resource strain: Not on file    Food insecurity:     Worry: Not on file     Inability: Not on file    Transportation needs:     Medical: Not on file     Non-medical: Not on file   Tobacco Use    Smoking status: Never Smoker    Smokeless tobacco: Never Used   Substance and Sexual Activity    Alcohol use: No     Comment: occasional    Drug use: No    Sexual activity: Yes   Lifestyle    Physical activity:     Days per week: Not on file     Minutes per session: Not on file    Stress: Not on file   Relationships    Social connections:     Talks on phone: Not on file     Gets together: Not on file     Attends Hinduism service: Not on file     Active member of club or organization: Not on file     Attends meetings of clubs or organizations: Not on file     Relationship status: Not on file   Other Topics Concern    Not on file   Social History Narrative    Not on file       Chief Complaint:   Chief Complaint   Patient presents with    Right Hand - Pain, Injury       Consulting Physician: No ref. provider found    History of present illness:    This is a 49 y.o. year old male who complains of right hand pain following a fall on 10/03/2019. He comes in today reporting pain 2/10.    Review of Systems:    Constitution: Denies chills, fever, and sweats.  HENT: Denies headaches or blurry vision.  Cardiovascular: Denies chest pain or irregular heart beat.  Respiratory: Denies cough or shortness of breath.  Gastrointestinal: Denies abdominal pain, nausea, or vomiting.  Musculoskeletal:  Denies muscle cramps.  Neurological: Denies dizziness or focal weakness.  Psychiatric/Behavioral: Normal mental status.  Hematologic/Lymphatic: Denies bleeding problem or easy bruising/bleeding.  Skin: Denies rash or suspicious  "lesions.    Examination:    Vital Signs:    Vitals:    10/14/19 1010   BP: 134/85   Pulse: (!) 56   Weight: 81.6 kg (179 lb 14.3 oz)   Height: 5' 6" (1.676 m)   PainSc:   2   PainLoc: Hand       Body mass index is 29.04 kg/m².    This a well-developed, well nourished patient in no acute distress.    Alert and oriented x 3 and cooperative to examination.       Physical Exam: Right Hand Exam    Skin  Scars:   None  Rash:   None    Inspection  Erythema:  None  Bruising:  None  Swelling:  Mild  Masses:  None  Lymphadenopathy: None    Coordination:  Normal  Instability:  Not tested due to fracture    Range of Motion Not tested due to fracture    Strength:  Not tested due to fracture    Tenderness:  Mild    Pulse:   2+ radial  Capillary Refill: Normal    Sensation:  Intact            Imaging: X-rays ordered and images interpreted today personally by me of right hand shows a minimally displaced 4th metacarpal shaft fracture.        Assessment: Right hand pain        Plan:  We will continue his splint and see him back in 2 weeks time.  He is a  and will not be able to work for approximately 6 weeks or longer.      DISCLAIMER: This note may have been dictated using voice recognition software and may contain grammatical errors.     NOTE: Consult report sent to referring provider via [employer] EMR.  "

## 2019-11-14 DIAGNOSIS — M79.641 RIGHT HAND PAIN: Primary | ICD-10-CM

## 2019-11-18 ENCOUNTER — HOSPITAL ENCOUNTER (OUTPATIENT)
Dept: RADIOLOGY | Facility: CLINIC | Age: 49
Discharge: HOME | End: 2019-11-18
Attending: ORTHOPAEDIC SURGERY
Payer: COMMERCIAL

## 2019-11-18 ENCOUNTER — OFFICE VISIT (OUTPATIENT)
Dept: ORTHOPEDICS | Facility: CLINIC | Age: 49
End: 2019-11-18
Payer: COMMERCIAL

## 2019-11-18 VITALS
WEIGHT: 179.88 LBS | BODY MASS INDEX: 28.91 KG/M2 | HEART RATE: 64 BPM | SYSTOLIC BLOOD PRESSURE: 130 MMHG | DIASTOLIC BLOOD PRESSURE: 81 MMHG | HEIGHT: 66 IN

## 2019-11-18 DIAGNOSIS — M79.641 RIGHT HAND PAIN: ICD-10-CM

## 2019-11-18 DIAGNOSIS — M79.641 RIGHT HAND PAIN: Primary | ICD-10-CM

## 2019-11-18 PROCEDURE — 3079F PR MOST RECENT DIASTOLIC BLOOD PRESSURE 80-89 MM HG: ICD-10-PCS | Mod: CPTII,S$GLB,, | Performed by: ORTHOPAEDIC SURGERY

## 2019-11-18 PROCEDURE — 3008F BODY MASS INDEX DOCD: CPT | Mod: CPTII,S$GLB,, | Performed by: ORTHOPAEDIC SURGERY

## 2019-11-18 PROCEDURE — 99999 PR PBB SHADOW E&M-EST. PATIENT-LVL III: ICD-10-PCS | Mod: PBBFAC,,, | Performed by: ORTHOPAEDIC SURGERY

## 2019-11-18 PROCEDURE — 3075F PR MOST RECENT SYSTOLIC BLOOD PRESS GE 130-139MM HG: ICD-10-PCS | Mod: CPTII,S$GLB,, | Performed by: ORTHOPAEDIC SURGERY

## 2019-11-18 PROCEDURE — 3075F SYST BP GE 130 - 139MM HG: CPT | Mod: CPTII,S$GLB,, | Performed by: ORTHOPAEDIC SURGERY

## 2019-11-18 PROCEDURE — 73130 XR HAND COMPLETE 3 VIEW RIGHT: ICD-10-PCS | Mod: 26,RT,S$GLB, | Performed by: RADIOLOGY

## 2019-11-18 PROCEDURE — 99213 OFFICE O/P EST LOW 20 MIN: CPT | Mod: S$GLB,,, | Performed by: ORTHOPAEDIC SURGERY

## 2019-11-18 PROCEDURE — 73130 X-RAY EXAM OF HAND: CPT | Mod: 26,RT,S$GLB, | Performed by: RADIOLOGY

## 2019-11-18 PROCEDURE — 99213 PR OFFICE/OUTPT VISIT, EST, LEVL III, 20-29 MIN: ICD-10-PCS | Mod: S$GLB,,, | Performed by: ORTHOPAEDIC SURGERY

## 2019-11-18 PROCEDURE — 73130 X-RAY EXAM OF HAND: CPT | Mod: TC,FY,PO,RT

## 2019-11-18 PROCEDURE — 99999 PR PBB SHADOW E&M-EST. PATIENT-LVL III: CPT | Mod: PBBFAC,,, | Performed by: ORTHOPAEDIC SURGERY

## 2019-11-18 PROCEDURE — 3079F DIAST BP 80-89 MM HG: CPT | Mod: CPTII,S$GLB,, | Performed by: ORTHOPAEDIC SURGERY

## 2019-11-18 PROCEDURE — 3008F PR BODY MASS INDEX (BMI) DOCUMENTED: ICD-10-PCS | Mod: CPTII,S$GLB,, | Performed by: ORTHOPAEDIC SURGERY

## 2019-11-21 NOTE — PROGRESS NOTES
Past Medical History:   Diagnosis Date    Allergy     Hypertension        History reviewed. No pertinent surgical history.    Current Outpatient Medications   Medication Sig    ciprofloxacin-dexamethasone 0.3-0.1% (CIPRODEX) 0.3-0.1 % DrpS Place 4 drops into both ears 2 (two) times daily.    cyclobenzaprine (FLEXERIL) 10 MG tablet Take 1 tablet (10 mg total) by mouth every evening.    HYDROcodone-acetaminophen (NORCO) 5-325 mg per tablet Take 1 tablet by mouth every 6 (six) hours as needed for Pain.    losartan (COZAAR) 50 MG tablet Take 50 mg by mouth once daily.    naproxen (NAPROSYN) 500 MG tablet Take 1 tablet (500 mg total) by mouth 2 (two) times daily.    vitamin D 1000 units Tab Take 185 mg by mouth once daily.    levocetirizine (XYZAL) 5 MG tablet Take 1 tablet (5 mg total) by mouth every evening.    valacyclovir (VALTREX) 1000 MG tablet Take 1 tablet (1,000 mg total) by mouth 3 (three) times daily.     No current facility-administered medications for this visit.        Review of patient's allergies indicates:  No Known Allergies    Family History   Problem Relation Age of Onset    Cancer Father         colon    No Known Problems Mother     No Known Problems Sister     No Known Problems Brother     No Known Problems Maternal Aunt     No Known Problems Maternal Uncle     No Known Problems Paternal Aunt     No Known Problems Paternal Uncle     No Known Problems Maternal Grandmother     No Known Problems Maternal Grandfather     No Known Problems Paternal Grandmother     No Known Problems Paternal Grandfather     Amblyopia Neg Hx     Blindness Neg Hx     Cataracts Neg Hx     Diabetes Neg Hx     Glaucoma Neg Hx     Hypertension Neg Hx     Macular degeneration Neg Hx     Retinal detachment Neg Hx     Strabismus Neg Hx     Stroke Neg Hx     Thyroid disease Neg Hx        Social History     Socioeconomic History    Marital status:      Spouse name: Not on file    Number of  children: Not on file    Years of education: Not on file    Highest education level: Not on file   Occupational History    Not on file   Social Needs    Financial resource strain: Not on file    Food insecurity:     Worry: Not on file     Inability: Not on file    Transportation needs:     Medical: Not on file     Non-medical: Not on file   Tobacco Use    Smoking status: Never Smoker    Smokeless tobacco: Never Used   Substance and Sexual Activity    Alcohol use: No     Comment: occasional    Drug use: No    Sexual activity: Yes   Lifestyle    Physical activity:     Days per week: Not on file     Minutes per session: Not on file    Stress: Not on file   Relationships    Social connections:     Talks on phone: Not on file     Gets together: Not on file     Attends Rastafarian service: Not on file     Active member of club or organization: Not on file     Attends meetings of clubs or organizations: Not on file     Relationship status: Not on file   Other Topics Concern    Not on file   Social History Narrative    Not on file       Chief Complaint:   Chief Complaint   Patient presents with    Right Hand - Pain, Injury       Consulting Physician: No ref. provider found    History of present illness:    This is a 49 y.o. year old male who complains of right hand pain following a fall on 10/03/2019. He comes in today reporting pain 0/10.    Review of Systems:    Constitution: Denies chills, fever, and sweats.  HENT: Denies headaches or blurry vision.  Cardiovascular: Denies chest pain or irregular heart beat.  Respiratory: Denies cough or shortness of breath.  Gastrointestinal: Denies abdominal pain, nausea, or vomiting.  Musculoskeletal:  Denies muscle cramps.  Neurological: Denies dizziness or focal weakness.  Psychiatric/Behavioral: Normal mental status.  Hematologic/Lymphatic: Denies bleeding problem or easy bruising/bleeding.  Skin: Denies rash or suspicious lesions.    Examination:    Vital Signs:   "  Vitals:    11/18/19 0947   BP: 130/81   Pulse: 64   Weight: 81.6 kg (179 lb 14.3 oz)   Height: 5' 6" (1.676 m)   PainSc: 0-No pain   PainLoc: Hand       Body mass index is 29.04 kg/m².    This a well-developed, well nourished patient in no acute distress.    Alert and oriented x 3 and cooperative to examination.       Physical Exam: Right Hand Exam    Skin  Scars:   None  Rash:   None    Inspection  Erythema:  None  Bruising:  None  Swelling:  Mild  Masses:  None  Lymphadenopathy: None    Coordination:  Normal  Instability:  None    Range of Motion near normal    Strength:  Near normal    Tenderness:  Mild fracture    Pulse:   2+ radial  Capillary Refill: Normal    Sensation:  Intact            Imaging: X-rays ordered and images interpreted today personally by me of right hand shows a minimally displaced 4th metacarpal shaft fracture.        Assessment: Right hand pain        Plan:  His x-rays show that he is healing.  We will give him a release to return for light duty on December 1st.  I would like to see him back in 6 weeks.  With new x-rays.    DISCLAIMER: This note may have been dictated using voice recognition software and may contain grammatical errors.     NOTE: Consult report sent to referring provider via EPIC EMR.  "

## 2019-12-09 ENCOUNTER — TELEPHONE (OUTPATIENT)
Dept: ORTHOPEDICS | Facility: CLINIC | Age: 49
End: 2019-12-09

## 2019-12-09 NOTE — TELEPHONE ENCOUNTER
----- Message from Matthew Chang MA sent at 12/9/2019  2:57 PM CST -----  Contact: patient  Patient has been calling and has not had a response, he dropped off paperwork for his employer last Thursday 12/5, please call back before completing paperwork, trying to avoid errors, please call back  Call back

## 2019-12-09 NOTE — TELEPHONE ENCOUNTER
Returned pt's call, he went over what he needs to be on each of the forms he left on Thursday.  Issues address w/ pt while he was on phone and forms left on providers desk for completion.  Will call pt once forms are complete for .

## 2019-12-26 DIAGNOSIS — M79.641 RIGHT HAND PAIN: Primary | ICD-10-CM

## 2019-12-30 ENCOUNTER — OFFICE VISIT (OUTPATIENT)
Dept: ORTHOPEDICS | Facility: CLINIC | Age: 49
End: 2019-12-30
Payer: COMMERCIAL

## 2019-12-30 ENCOUNTER — HOSPITAL ENCOUNTER (OUTPATIENT)
Dept: RADIOLOGY | Facility: HOSPITAL | Age: 49
Discharge: HOME | End: 2019-12-30
Attending: ORTHOPAEDIC SURGERY
Payer: COMMERCIAL

## 2019-12-30 VITALS
HEART RATE: 71 BPM | SYSTOLIC BLOOD PRESSURE: 116 MMHG | DIASTOLIC BLOOD PRESSURE: 69 MMHG | BODY MASS INDEX: 28.77 KG/M2 | WEIGHT: 179 LBS | HEIGHT: 66 IN

## 2019-12-30 DIAGNOSIS — M79.641 RIGHT HAND PAIN: ICD-10-CM

## 2019-12-30 DIAGNOSIS — S62.324D CLOSED DISPLACED FRACTURE OF SHAFT OF FOURTH METACARPAL BONE OF RIGHT HAND WITH ROUTINE HEALING, SUBSEQUENT ENCOUNTER: Primary | ICD-10-CM

## 2019-12-30 DIAGNOSIS — M79.641 RIGHT HAND PAIN: Primary | ICD-10-CM

## 2019-12-30 PROCEDURE — 99213 OFFICE O/P EST LOW 20 MIN: CPT | Mod: S$GLB,,, | Performed by: ORTHOPAEDIC SURGERY

## 2019-12-30 PROCEDURE — 73130 X-RAY EXAM OF HAND: CPT | Mod: 26,RT,, | Performed by: RADIOLOGY

## 2019-12-30 PROCEDURE — 99999 PR PBB SHADOW E&M-EST. PATIENT-LVL III: ICD-10-PCS | Mod: PBBFAC,,, | Performed by: ORTHOPAEDIC SURGERY

## 2019-12-30 PROCEDURE — 3074F SYST BP LT 130 MM HG: CPT | Mod: CPTII,S$GLB,, | Performed by: ORTHOPAEDIC SURGERY

## 2019-12-30 PROCEDURE — 3078F DIAST BP <80 MM HG: CPT | Mod: CPTII,S$GLB,, | Performed by: ORTHOPAEDIC SURGERY

## 2019-12-30 PROCEDURE — 99213 PR OFFICE/OUTPT VISIT, EST, LEVL III, 20-29 MIN: ICD-10-PCS | Mod: S$GLB,,, | Performed by: ORTHOPAEDIC SURGERY

## 2019-12-30 PROCEDURE — 3078F PR MOST RECENT DIASTOLIC BLOOD PRESSURE < 80 MM HG: ICD-10-PCS | Mod: CPTII,S$GLB,, | Performed by: ORTHOPAEDIC SURGERY

## 2019-12-30 PROCEDURE — 3008F PR BODY MASS INDEX (BMI) DOCUMENTED: ICD-10-PCS | Mod: CPTII,S$GLB,, | Performed by: ORTHOPAEDIC SURGERY

## 2019-12-30 PROCEDURE — 3074F PR MOST RECENT SYSTOLIC BLOOD PRESSURE < 130 MM HG: ICD-10-PCS | Mod: CPTII,S$GLB,, | Performed by: ORTHOPAEDIC SURGERY

## 2019-12-30 PROCEDURE — 99999 PR PBB SHADOW E&M-EST. PATIENT-LVL III: CPT | Mod: PBBFAC,,, | Performed by: ORTHOPAEDIC SURGERY

## 2019-12-30 PROCEDURE — 73130 XR HAND COMPLETE 3 VIEW RIGHT: ICD-10-PCS | Mod: 26,RT,, | Performed by: RADIOLOGY

## 2019-12-30 PROCEDURE — 73130 X-RAY EXAM OF HAND: CPT | Mod: TC,PN,RT

## 2019-12-30 PROCEDURE — 3008F BODY MASS INDEX DOCD: CPT | Mod: CPTII,S$GLB,, | Performed by: ORTHOPAEDIC SURGERY

## 2019-12-30 NOTE — PROGRESS NOTES
Past Medical History:   Diagnosis Date    Allergy     Hypertension        History reviewed. No pertinent surgical history.    Current Outpatient Medications   Medication Sig    ciprofloxacin-dexamethasone 0.3-0.1% (CIPRODEX) 0.3-0.1 % DrpS Place 4 drops into both ears 2 (two) times daily.    cyclobenzaprine (FLEXERIL) 10 MG tablet Take 1 tablet (10 mg total) by mouth every evening.    HYDROcodone-acetaminophen (NORCO) 5-325 mg per tablet Take 1 tablet by mouth every 6 (six) hours as needed for Pain.    losartan (COZAAR) 50 MG tablet Take 50 mg by mouth once daily.    naproxen (NAPROSYN) 500 MG tablet Take 1 tablet (500 mg total) by mouth 2 (two) times daily.    vitamin D 1000 units Tab Take 185 mg by mouth once daily.    levocetirizine (XYZAL) 5 MG tablet Take 1 tablet (5 mg total) by mouth every evening.    valacyclovir (VALTREX) 1000 MG tablet Take 1 tablet (1,000 mg total) by mouth 3 (three) times daily.     No current facility-administered medications for this visit.        Review of patient's allergies indicates:  No Known Allergies    Family History   Problem Relation Age of Onset    Cancer Father         colon    No Known Problems Mother     No Known Problems Sister     No Known Problems Brother     No Known Problems Maternal Aunt     No Known Problems Maternal Uncle     No Known Problems Paternal Aunt     No Known Problems Paternal Uncle     No Known Problems Maternal Grandmother     No Known Problems Maternal Grandfather     No Known Problems Paternal Grandmother     No Known Problems Paternal Grandfather     Amblyopia Neg Hx     Blindness Neg Hx     Cataracts Neg Hx     Diabetes Neg Hx     Glaucoma Neg Hx     Hypertension Neg Hx     Macular degeneration Neg Hx     Retinal detachment Neg Hx     Strabismus Neg Hx     Stroke Neg Hx     Thyroid disease Neg Hx        Social History     Socioeconomic History    Marital status:      Spouse name: Not on file    Number of  children: Not on file    Years of education: Not on file    Highest education level: Not on file   Occupational History    Not on file   Social Needs    Financial resource strain: Not on file    Food insecurity:     Worry: Not on file     Inability: Not on file    Transportation needs:     Medical: Not on file     Non-medical: Not on file   Tobacco Use    Smoking status: Never Smoker    Smokeless tobacco: Never Used   Substance and Sexual Activity    Alcohol use: No     Comment: occasional    Drug use: No    Sexual activity: Yes   Lifestyle    Physical activity:     Days per week: Not on file     Minutes per session: Not on file    Stress: Not on file   Relationships    Social connections:     Talks on phone: Not on file     Gets together: Not on file     Attends Yarsanism service: Not on file     Active member of club or organization: Not on file     Attends meetings of clubs or organizations: Not on file     Relationship status: Not on file   Other Topics Concern    Not on file   Social History Narrative    Not on file       Chief Complaint:   Chief Complaint   Patient presents with    Hand Injury     right hand 4th metacarpal fx DOI 10/3/19       Consulting Physician: No ref. provider found    History of present illness:    This is a 49 y.o. year old male who complains of right hand pain following a fall on 10/03/2019. He comes in today reporting pain 0/10.    Review of Systems:    Constitution: Denies chills, fever, and sweats.  HENT: Denies headaches or blurry vision.  Cardiovascular: Denies chest pain or irregular heart beat.  Respiratory: Denies cough or shortness of breath.  Gastrointestinal: Denies abdominal pain, nausea, or vomiting.  Musculoskeletal:  Denies muscle cramps.  Neurological: Denies dizziness or focal weakness.  Psychiatric/Behavioral: Normal mental status.  Hematologic/Lymphatic: Denies bleeding problem or easy bruising/bleeding.  Skin: Denies rash or suspicious  "lesions.    Examination:    Vital Signs:    Vitals:    12/30/19 0923   BP: 116/69   Pulse: 71   Weight: 81.2 kg (179 lb)   Height: 5' 6" (1.676 m)   PainSc:   2       Body mass index is 28.89 kg/m².    This a well-developed, well nourished patient in no acute distress.    Alert and oriented x 3 and cooperative to examination.       Physical Exam: Right Hand Exam    Skin  Scars:   None  Rash:   None    Inspection  Erythema:  None  Bruising:  None  Swelling:  Mild  Masses:  None  Lymphadenopathy: None    Coordination:  Normal  Instability:  None    Range of Motion near normal    Strength:  Near normal    Tenderness:  None    Pulse:   2+ radial  Capillary Refill: Normal    Sensation:  Intact            Imaging: X-rays ordered and images interpreted today personally by me of right hand shows a minimally displaced 4th metacarpal shaft fracture with healing.       Assessment: Right hand pain        Plan:  His x-rays show that he is healing.  We will give him a release to return for light duty.  He is reporting some  weakness and swelling so we will get him started in occupational therapy.  We will see him back in 4 weeks time to re-evaluate him for full release.    DISCLAIMER: This note may have been dictated using voice recognition software and may contain grammatical errors.     NOTE: Consult report sent to referring provider via EPIC EMR.  "

## 2020-01-23 ENCOUNTER — CLINICAL SUPPORT (OUTPATIENT)
Dept: REHABILITATION | Facility: HOSPITAL | Age: 50
End: 2020-01-23
Attending: ORTHOPAEDIC SURGERY
Payer: COMMERCIAL

## 2020-01-23 DIAGNOSIS — M25.541 PAIN IN JOINT OF RIGHT HAND: ICD-10-CM

## 2020-01-23 DIAGNOSIS — M79.641 RIGHT HAND PAIN: Primary | ICD-10-CM

## 2020-01-23 DIAGNOSIS — R29.898 RIGHT HAND WEAKNESS: ICD-10-CM

## 2020-01-23 DIAGNOSIS — M25.641 STIFFNESS OF HAND JOINT, RIGHT: ICD-10-CM

## 2020-01-23 DIAGNOSIS — M25.441 HAND JOINT EFFUSION, RIGHT: Primary | ICD-10-CM

## 2020-01-23 PROCEDURE — 97165 OT EVAL LOW COMPLEX 30 MIN: CPT | Mod: PN

## 2020-01-23 PROCEDURE — 97022 WHIRLPOOL THERAPY: CPT | Mod: PN

## 2020-01-23 NOTE — PLAN OF CARE
[facility] Occupational Therapy  Initial Evaluation     Date: 1/23/2020  Name: Dwayne Candelario  Clinic Number: [id number]    Therapy Diagnosis:   Encounter Diagnoses   Name Primary?    Hand joint effusion, right Yes    Stiffness of hand joint, right     Pain in joint of right hand     Right hand weakness      Physician: Remigio Gagnon MD    Physician Orders: eval and tx  Medical Diagnosis: right 4th minimally displaced metacarpal shaft fx  Surgical Procedure and Date: n/a, n/a  Evaluation Date: 1/23/2020  Insurance Authorization Period Expiration: 12-30-19 thru 6-1-20  Plan of Care Certification Period: 1-23-20 thru 2-20-20  Date of Return to MD: see epic    Visit # / Visits authorized: 1 / 20  Time In:845  Time Out: 930  Total Billable Time: 45 minutes    Precautions:  universal  Subjective     Involved Side: right  Dominant Side: ambidextrous  Date of Onset: 10-3-19  History of Current Condition/Mechanism of Injury: fell, er same day where he was xrayed and immobilized; followed by referring md since er visit and was sent to therapy on 12-30-19; reports some mix up with scheduling therefore is starting therapy roughly 1 month post order placed  Imaging: xray  Previous Therapy: none    Past Medical History/Physical Systems Review:   Dwayne Candelario  has a past medical history of Allergy and Hypertension.    Dwayne Candelario  has no past surgical history on file.    Dwayne has a current medication list which includes the following prescription(s): ciprofloxacin-dexamethasone 0.3-0.1%, cyclobenzaprine, hydrocodone-acetaminophen, levocetirizine, losartan, naproxen, valacyclovir, and vitamin d.    Review of patient's allergies indicates:  No Known Allergies     Patient's Goals for Therapy: full painless use    Pain:  Functional Pain Scale Rating 0-10:   3/10 on average  1/10 at best  510 at worst  Location: diffuse thru hand and wrist  Description: ache  Aggravating Factors: light use  Easing  Factors: rest  Occupation:  [employer]  Working presently: yes/light duty  Duties: Normal self and home care tasks    Functional Limitations/Social History:    Previous functional status includes: Independent with all ADLs.     Current Functional Status   Home/Living environment : lives with spouse    Limitation of Functional Status as follows: power  and full dexterity of wrist not possible yet   ADLs/IADLs:               Doing basic self care with no problems but has not tested it yet with all home tasks and work tasks   Leisure: not tested  pain meds: denies  nicotine: denies  caffeine: denies    Objective   Posture:visually edematous wrist and hand  Palpation:nt  Sensation:intermittent diffuse tingling in right hand  ROM:  Prom                Right            Left  Sup/pro           90/80            90/80  Df/pf                60/60           80/90  Rd/ud              20/40           20/40    Full thumb prom all planes vs unaffected side    Prom                              Index          Long              Ring            Small   Composite flex   0 cm to dpc           0                      0                   0  Intrinsic stretch  0 cm to A1            0                       2                   1  Composite ext      Full                   Full                   Full               Full       Edema:circumferential     wrist right 18.0 cm left 17.5         mcps  right 22.4 cm left 22.0          CMS Impairment/Limitation/Restriction for FOTO  Survey    Therapist reviewed FOTO scores for Dwayne Candelario on 1/23/2020.   FOTO documents entered into [employer] - see Media section.    Limitation Score: 43%  Category: Carrying    Current : CK = at least 40% but < 60% impaired, limited or restricted  Goal: CJ = at least 20% but < 40% impaired, limited or restricted               Treatment     Treatment Time In: 915  Treatment Time Out: 930  Total Treatment time separate from Evaluation time:15    Dwayne received the  following supervised modalities after being cleared for contradictions for 15 minutes:   -fluido          Issued HEP as below:       current home program 1-23-20  -use hand for light painless nonrepetitive use only  -do below exercises 10 times, 6 x day          Home Exercise Program/Education:  Issued HEP (see patient instructions in EMR) . Exercises were reviewed and Dwayne was able to demonstrate them prior to the end of the session.   Pt received a written copy of exercises to perform at home. Dwayne demonstrated good understanding of the education provided.  Pt was advised to perform these exercises free of pain, and to stop performing them if pain occurs.    Patient/Family Education: role of OT, goals for OT, scheduling/cancellations - pt verbalized understanding. Discussed insurance limitations with patient.    Additional Education provided: likely tx progression, expectations of rehab    Assessment     Dwayne Candelario is a 49 y.o. male referred to outpatient occupational therapy and presents with a medical diagnosis of see above, resulting in Decreased ROM, Decreased muscle strength, Decreased functional hand use, Increased pain, Edema and Joint Stiffness and demonstrates limitations as described in the chart below. Following medical record review it is determined that pt will benefit from occupational therapy services in order to maximize pain free and/or functional use of right hand. The following goals were discussed with the patient and patient is in agreement with them as to be addressed in the treatment plan. The patient's rehab potential is good.     Anticipated barriers to occupational therapy: edema, pain, stiffness, weakness  Pt has no cultural, educational or language barriers to learning provided.    Profile and History Assessment of Occupational Performance Level of Clinical Decision Making Complexity Score   Occupational Profile:   Dwayne Candelario is a 49 y.o. male who lives with their spouse and is  currently employed Dwayne Candelario has difficulty with  ADLs and IADLs as listed previously, which  affecting his/her daily functional abilities.      Comorbidities:    has a past medical history of Allergy and Hypertension.    Medical and Therapy History Review:   Brief               Performance Deficits    Physical:  Joint Mobility  Muscle Power/Strength  Edema  Pain    Cognitive:  No Deficits    Psychosocial:    No Deficits     Clinical Decision Making:  low    Assessment Process:  Problem-Focused Assessments    Modification/Need for Assistance:  Not Necessary    Intervention Selection:  Limited Treatment Options       low  Based on PMHX, co morbidities , data from assessments and functional level of assistance required with task and clinical presentation directly impacting function.           The following goals were discussed with the patient and patient is in agreement with them as to be addressed in the treatment plan.     Goals:   stg to be met in 3 weeks 1. Patient will be I with HEP 2. Patient will have 2/10 pain with light use 3. Patient will have = prom of bilateral wrist and hands   to enhance affected arm use with ADL      ltg to be met by d/c 1. Patient will be I with d/c HEP 2. Patient will have 1/10 pain with all use 3. Patient will have about 75% /pinch  on affected side vs unaffected side to promote full functional use                                                                          4. Patient will be I with all ADL        Plan   Certification Period/Plan of care expiration: 1/23/2020 to 2-20-20.    Outpatient Occupational Therapy 2 times weekly for 4 weeks to include the following interventions: eval and tx  [facility]

## 2020-01-27 ENCOUNTER — OFFICE VISIT (OUTPATIENT)
Dept: ORTHOPEDICS | Facility: CLINIC | Age: 50
End: 2020-01-27
Payer: COMMERCIAL

## 2020-01-27 ENCOUNTER — CLINICAL SUPPORT (OUTPATIENT)
Dept: REHABILITATION | Facility: HOSPITAL | Age: 50
End: 2020-01-27
Attending: ORTHOPAEDIC SURGERY
Payer: COMMERCIAL

## 2020-01-27 ENCOUNTER — HOSPITAL ENCOUNTER (OUTPATIENT)
Dept: RADIOLOGY | Facility: HOSPITAL | Age: 50
Discharge: HOME | End: 2020-01-27
Attending: ORTHOPAEDIC SURGERY
Payer: COMMERCIAL

## 2020-01-27 VITALS — BODY MASS INDEX: 28.77 KG/M2 | RESPIRATION RATE: 18 BRPM | WEIGHT: 179 LBS | HEIGHT: 66 IN

## 2020-01-27 DIAGNOSIS — M79.641 RIGHT HAND PAIN: ICD-10-CM

## 2020-01-27 DIAGNOSIS — M25.641 STIFFNESS OF HAND JOINT, RIGHT: ICD-10-CM

## 2020-01-27 DIAGNOSIS — S62.324D CLOSED DISPLACED FRACTURE OF SHAFT OF FOURTH METACARPAL BONE OF RIGHT HAND WITH ROUTINE HEALING, SUBSEQUENT ENCOUNTER: Primary | ICD-10-CM

## 2020-01-27 DIAGNOSIS — M25.541 PAIN IN JOINT OF RIGHT HAND: ICD-10-CM

## 2020-01-27 DIAGNOSIS — M25.441 HAND JOINT EFFUSION, RIGHT: Primary | ICD-10-CM

## 2020-01-27 DIAGNOSIS — R29.898 RIGHT HAND WEAKNESS: ICD-10-CM

## 2020-01-27 PROCEDURE — 73130 X-RAY EXAM OF HAND: CPT | Mod: 26,RT,, | Performed by: RADIOLOGY

## 2020-01-27 PROCEDURE — 97022 WHIRLPOOL THERAPY: CPT | Mod: PN

## 2020-01-27 PROCEDURE — 99213 PR OFFICE/OUTPT VISIT, EST, LEVL III, 20-29 MIN: ICD-10-PCS | Mod: S$GLB,,, | Performed by: ORTHOPAEDIC SURGERY

## 2020-01-27 PROCEDURE — 3008F PR BODY MASS INDEX (BMI) DOCUMENTED: ICD-10-PCS | Mod: CPTII,S$GLB,, | Performed by: ORTHOPAEDIC SURGERY

## 2020-01-27 PROCEDURE — 99213 OFFICE O/P EST LOW 20 MIN: CPT | Mod: S$GLB,,, | Performed by: ORTHOPAEDIC SURGERY

## 2020-01-27 PROCEDURE — 97110 THERAPEUTIC EXERCISES: CPT | Mod: PN

## 2020-01-27 PROCEDURE — 73130 XR HAND COMPLETE 3 VIEW RIGHT: ICD-10-PCS | Mod: 26,RT,, | Performed by: RADIOLOGY

## 2020-01-27 PROCEDURE — 99999 PR PBB SHADOW E&M-EST. PATIENT-LVL III: CPT | Mod: PBBFAC,,, | Performed by: ORTHOPAEDIC SURGERY

## 2020-01-27 PROCEDURE — 99999 PR PBB SHADOW E&M-EST. PATIENT-LVL III: ICD-10-PCS | Mod: PBBFAC,,, | Performed by: ORTHOPAEDIC SURGERY

## 2020-01-27 PROCEDURE — 73130 X-RAY EXAM OF HAND: CPT | Mod: TC,PN,RT

## 2020-01-27 PROCEDURE — 3008F BODY MASS INDEX DOCD: CPT | Mod: CPTII,S$GLB,, | Performed by: ORTHOPAEDIC SURGERY

## 2020-01-27 NOTE — PROGRESS NOTES
Past Medical History:   Diagnosis Date    Allergy     Hypertension        History reviewed. No pertinent surgical history.    Current Outpatient Medications   Medication Sig    ciprofloxacin-dexamethasone 0.3-0.1% (CIPRODEX) 0.3-0.1 % DrpS Place 4 drops into both ears 2 (two) times daily. (Patient not taking: Reported on 1/27/2020)    cyclobenzaprine (FLEXERIL) 10 MG tablet Take 1 tablet (10 mg total) by mouth every evening. (Patient not taking: Reported on 1/27/2020)    HYDROcodone-acetaminophen (NORCO) 5-325 mg per tablet Take 1 tablet by mouth every 6 (six) hours as needed for Pain. (Patient not taking: Reported on 1/27/2020)    levocetirizine (XYZAL) 5 MG tablet Take 1 tablet (5 mg total) by mouth every evening.    losartan (COZAAR) 50 MG tablet Take 50 mg by mouth once daily.    naproxen (NAPROSYN) 500 MG tablet Take 1 tablet (500 mg total) by mouth 2 (two) times daily. (Patient not taking: Reported on 1/27/2020)    valacyclovir (VALTREX) 1000 MG tablet Take 1 tablet (1,000 mg total) by mouth 3 (three) times daily.    vitamin D 1000 units Tab Take 185 mg by mouth once daily.     No current facility-administered medications for this visit.        Review of patient's allergies indicates:  No Known Allergies    Family History   Problem Relation Age of Onset    Cancer Father         colon    No Known Problems Mother     No Known Problems Sister     No Known Problems Brother     No Known Problems Maternal Aunt     No Known Problems Maternal Uncle     No Known Problems Paternal Aunt     No Known Problems Paternal Uncle     No Known Problems Maternal Grandmother     No Known Problems Maternal Grandfather     No Known Problems Paternal Grandmother     No Known Problems Paternal Grandfather     Amblyopia Neg Hx     Blindness Neg Hx     Cataracts Neg Hx     Diabetes Neg Hx     Glaucoma Neg Hx     Hypertension Neg Hx     Macular degeneration Neg Hx     Retinal detachment Neg Hx      Strabismus Neg Hx     Stroke Neg Hx     Thyroid disease Neg Hx        Social History     Socioeconomic History    Marital status:      Spouse name: Not on file    Number of children: Not on file    Years of education: Not on file    Highest education level: Not on file   Occupational History    Not on file   Social Needs    Financial resource strain: Not on file    Food insecurity:     Worry: Not on file     Inability: Not on file    Transportation needs:     Medical: Not on file     Non-medical: Not on file   Tobacco Use    Smoking status: Never Smoker    Smokeless tobacco: Never Used   Substance and Sexual Activity    Alcohol use: No     Comment: occasional    Drug use: No    Sexual activity: Yes   Lifestyle    Physical activity:     Days per week: Not on file     Minutes per session: Not on file    Stress: Not on file   Relationships    Social connections:     Talks on phone: Not on file     Gets together: Not on file     Attends Sabianist service: Not on file     Active member of club or organization: Not on file     Attends meetings of clubs or organizations: Not on file     Relationship status: Not on file   Other Topics Concern    Not on file   Social History Narrative    Not on file       Chief Complaint:   Chief Complaint   Patient presents with    Right Hand - Pain, Injury       Consulting Physician: No ref. provider found    History of present illness:    This is a 49 y.o. year old male who complains of right hand pain following a fall on 10/03/2019. He comes in today reporting pain 0/10.    Review of Systems:    Constitution: Denies chills, fever, and sweats.  HENT: Denies headaches or blurry vision.  Cardiovascular: Denies chest pain or irregular heart beat.  Respiratory: Denies cough or shortness of breath.  Gastrointestinal: Denies abdominal pain, nausea, or vomiting.  Musculoskeletal:  Denies muscle cramps.  Neurological: Denies dizziness or focal  "weakness.  Psychiatric/Behavioral: Normal mental status.  Hematologic/Lymphatic: Denies bleeding problem or easy bruising/bleeding.  Skin: Denies rash or suspicious lesions.    Examination:    Vital Signs:    Vitals:    01/27/20 0957   Resp: 18   Weight: 81.2 kg (179 lb 0.2 oz)   Height: 5' 6" (1.676 m)   PainSc: 0-No pain   PainLoc: Hand       Body mass index is 28.89 kg/m².    This a well-developed, well nourished patient in no acute distress.    Alert and oriented x 3 and cooperative to examination.       Physical Exam: Right Hand Exam    Skin  Scars:   None  Rash:   None    Inspection  Erythema:  None  Bruising:  None  Swelling:  Mild  Masses:  None  Lymphadenopathy: None    Coordination:  Normal  Instability:  None    Range of Motion near normal    Strength:  Near normal    Tenderness:  None    Pulse:   2+ radial  Capillary Refill: Normal    Sensation:  Intact            Imaging: X-rays ordered and images interpreted today personally by me of right hand shows a minimally displaced 4th metacarpal shaft fracture with healing.       Assessment: Closed displaced fracture of shaft of fourth metacarpal bone of right hand with routine healing, subsequent encounter        Plan:  His x-rays show that he is healing.  OK for light duty.  He is still reporting some  weakness and swelling. He has only had 2 visits for occupational therapy.  We will see him back in 3 weeks time to re-evaluate him for full release.    DISCLAIMER: This note may have been dictated using voice recognition software and may contain grammatical errors.     NOTE: Consult report sent to referring provider via EPIC EMR.  "

## 2020-01-27 NOTE — PROGRESS NOTES
Occupational Therapy Daily Treatment Note     Date: 1/27/2020  Name: Dwayne Candelario  [facility] Number: [id number]    Therapy Diagnosis:   Encounter Diagnoses   Name Primary?    Hand joint effusion, right Yes    Stiffness of hand joint, right     Right hand weakness     Pain in joint of right hand      Physician: Remigio Gagnon MD    Physician Orders: eval and tx  Medical Diagnosis: right 4th minimally displaced metacarpal shaft fx  Surgical Procedure and Date: n/a, n/a  Evaluation Date: 1/23/2020  Insurance Authorization Period Expiration: 12-30-19 thru 6-1-20  Plan of Care Certification Period: 1-23-20 thru 2-20-20  Date of Return to MD: see epic    Visit # / Visits authorized: 2 / 20  Time In:1130  Time Out: 1230  Total Billable Time: 60 minutes    Precautions:  universal    Subjective     Pt reports: no new issues  he is compliant with home exercise program.  Response to previous treatment:good  Functional change: light grasps slowly increasing with basic use    Pain: 0/10 rest; 3/10 light use  Location: diffuse      Hand  ache  Objective       Timed units:  3 therex                            Time:9817-1867    Untimed units:  fluido                           Time:4971-8349      Measurements: n/a        Fluido: 15'  U/s: n/a      UBE: level 1 x 10'    Stretches as tolerated-pain free: df,pf; intrinsic for lf, rf, sf      Manual: n/a    Scar massage: n/a    PRE: red flex bar smiles/frowns 3 x 10      HEP: issued df/pf stretches    Home Exercises and Education Provided     Education provided:     progress towards goals   likely tx progression  rationale of rehab interventions    Written Home Exercises Provided: yes.  Exercises were reviewed and Dwayne was able to demonstrate them prior to the end of the session.  Dwayne demonstrated good understanding of the HEP provided.     Df/pf stretches     Assessment     Pt would continue to benefit from skilled OT in order to facilitate full use and proper mechanics.    Dwayne  is progressing well towards his goals and there are no updates to goals at this time. Pt prognosis is good.  Pt will continue to benefit from skilled outpatient occupational therapy to address the deficits listed in the problem list on initial evaluation to provide pt/family education and to maximize pt's level of independence in the home and community environment.     Anticipated barriers to occupational therapy: edema, pain, stiffness, weakness    Pt's spiritual, cultural and educational needs considered and pt agreeable to plan of care and goals.    Goals:  stg to be met in 3 weeks 1. Patient will be I with HEP 2. Patient will have 2/10 pain with light use 3. Patient will have = prom of bilateral wrist and hands   to enhance affected arm use with ADL        ltg to be met by d/c 1. Patient will be I with d/c HEP 2. Patient will have 1/10 pain with all use 3. Patient will have about 75% /pinch  on affected side vs unaffected side to promote full functional use                                                                          4. Patient will be I with all ADL                Any goals met today ?  (if any met see above)    Updates/Grading for next session: prn    Plan: edema control, stretching, ROM, patient education, PRE, HEP      Campbell Martinez, OT /CHT

## 2020-01-30 ENCOUNTER — CLINICAL SUPPORT (OUTPATIENT)
Dept: REHABILITATION | Facility: HOSPITAL | Age: 50
End: 2020-01-30
Attending: ORTHOPAEDIC SURGERY
Payer: COMMERCIAL

## 2020-01-30 DIAGNOSIS — R29.898 RIGHT HAND WEAKNESS: ICD-10-CM

## 2020-01-30 DIAGNOSIS — M25.641 STIFFNESS OF HAND JOINT, RIGHT: ICD-10-CM

## 2020-01-30 DIAGNOSIS — M25.541 PAIN IN JOINT OF RIGHT HAND: ICD-10-CM

## 2020-01-30 DIAGNOSIS — M25.441 HAND JOINT EFFUSION, RIGHT: Primary | ICD-10-CM

## 2020-01-30 PROCEDURE — 97110 THERAPEUTIC EXERCISES: CPT | Mod: PN

## 2020-01-30 PROCEDURE — 97022 WHIRLPOOL THERAPY: CPT | Mod: PN

## 2020-01-30 NOTE — PROGRESS NOTES
Occupational Therapy Daily Treatment Note     Date: 1/30/2020  Name: Dwayne Candelario  [facility] Number: [id number]    Therapy Diagnosis:   Encounter Diagnoses   Name Primary?    Hand joint effusion, right Yes    Stiffness of hand joint, right     Right hand weakness     Pain in joint of right hand      Physician: Remigio Gagnon MD    Physician Orders: eval and tx  Medical Diagnosis: right 4th minimally displaced metacarpal shaft fx  Surgical Procedure and Date: n/a, n/a  Evaluation Date: 1/23/2020  Insurance Authorization Period Expiration: 12-30-19 thru 6-1-20  Plan of Care Certification Period: 1-23-20 thru 2-20-20  Date of Return to MD: see epic    Visit # / Visits authorized: 3 / 20  Time In:930  Time Out: 1030  Total Billable Time: 60 minutes    Precautions:  universal    Subjective     Pt reports: no new issues  he is compliant with home exercise program.  Response to previous treatment:good  Functional change: light use with basic tasks continues to increase, power  and sustained  limited still  Pain: 0/10 rest; 3/10 light use  Location: diffuse      Hand  ache  Objective       Timed units:  3 therex                            Time:945-1030    Untimed units:  fluido                           Time:930-945      Measurements: n/a        Fluido: 15'  U/s: n/a      UBE: level 1 x 10'    Stretches as tolerated-pain free: df,pf; intrinsic for lf, rf, sf      Manual: n/a    Scar massage: n/a    PRE: green flex bar smiles/frowns 3 x 10; small splint stick yellow putty pull/push x 30, pushdowns x 30; yellow putty cones x 30      HEP: issued intrinsic stretch for digits 3 thru 5    Home Exercises and Education Provided     Education provided:     progress towards goals   likely tx progression  rationale of rehab interventions    Written Home Exercises Provided: yes.  Exercises were reviewed and Dwayne was able to demonstrate them prior to the end of the session.  Dwayne demonstrated good understanding of the HEP  provided.         Assessment     Fixing tightness and improving strength imperative to get good mechanics and full use    Pt would continue to benefit from skilled OT in order to facilitate full use and proper mechanics.    Dwayne is progressing well towards his goals and there are no updates to goals at this time. Pt prognosis is good.  Pt will continue to benefit from skilled outpatient occupational therapy to address the deficits listed in the problem list on initial evaluation to provide pt/family education and to maximize pt's level of independence in the home and community environment.     Anticipated barriers to occupational therapy: edema, pain, stiffness, weakness    Pt's spiritual, cultural and educational needs considered and pt agreeable to plan of care and goals.    Goals:  stg to be met in 3 weeks 1. Patient will be I with HEP 2. Patient will have 2/10 pain with light use 3. Patient will have = prom of bilateral wrist and hands  to enhance affected arm use with ADL        ltg to be met by d/c 1. Patient will be I with d/c HEP 2. Patient will have 1/10 pain with all use 3. Patient will have about 75% /pinch  on affected side vs unaffected side to promote full functional use                                                                          4. Patient will be I with all ADL                Any goals met today ?  (if any met see above)    Updates/Grading for next session: consider  and pinch testing    Plan: edema control, stretching, ROM, patient education, PRE, HEP      Campbell Martinez, OT /CHT

## 2020-01-30 NOTE — PATIENT INSTRUCTIONS
current home program update 1-30-20  -do on long, ring, and small; do 1, 2 x day, hold as long as you can, mild discomfort only

## 2020-02-04 ENCOUNTER — CLINICAL SUPPORT (OUTPATIENT)
Dept: REHABILITATION | Facility: HOSPITAL | Age: 50
End: 2020-02-04
Attending: ORTHOPAEDIC SURGERY
Payer: COMMERCIAL

## 2020-02-04 DIAGNOSIS — R29.898 RIGHT HAND WEAKNESS: ICD-10-CM

## 2020-02-04 DIAGNOSIS — M25.541 PAIN IN JOINT OF RIGHT HAND: ICD-10-CM

## 2020-02-04 DIAGNOSIS — M25.641 STIFFNESS OF HAND JOINT, RIGHT: ICD-10-CM

## 2020-02-04 DIAGNOSIS — M25.441 HAND JOINT EFFUSION, RIGHT: Primary | ICD-10-CM

## 2020-02-04 PROCEDURE — 97110 THERAPEUTIC EXERCISES: CPT | Mod: PN

## 2020-02-04 PROCEDURE — 97022 WHIRLPOOL THERAPY: CPT | Mod: PN

## 2020-02-11 ENCOUNTER — CLINICAL SUPPORT (OUTPATIENT)
Dept: REHABILITATION | Facility: HOSPITAL | Age: 50
End: 2020-02-11
Attending: ORTHOPAEDIC SURGERY
Payer: COMMERCIAL

## 2020-02-11 DIAGNOSIS — M25.641 STIFFNESS OF HAND JOINT, RIGHT: ICD-10-CM

## 2020-02-11 DIAGNOSIS — R29.898 RIGHT HAND WEAKNESS: ICD-10-CM

## 2020-02-11 DIAGNOSIS — M25.541 PAIN IN JOINT OF RIGHT HAND: ICD-10-CM

## 2020-02-11 DIAGNOSIS — M25.441 HAND JOINT EFFUSION, RIGHT: Primary | ICD-10-CM

## 2020-02-11 PROCEDURE — 97110 THERAPEUTIC EXERCISES: CPT | Mod: PN

## 2020-02-11 NOTE — PROGRESS NOTES
Occupational Therapy Daily Treatment Note     Date: 2/11/2020  Name: Dwayne Candelario  Clinic Number: [id number]    Therapy Diagnosis:   Encounter Diagnoses   Name Primary?    Hand joint effusion, right Yes    Stiffness of hand joint, right     Right hand weakness     Pain in joint of right hand      Physician: Remigio Gagnon MD    Physician Orders: eval and tx  Medical Diagnosis: right 4th minimally displaced metacarpal shaft fx  Surgical Procedure and Date: n/a, n/a  Evaluation Date: 1/23/2020  Insurance Authorization Period Expiration: 12-30-19 thru 6-1-20  Plan of Care Certification Period: 1-23-20 thru 2-20-20  Date of Return to MD: see epic    Visit # / Visits authorized: 5 / 20  Time In:945  Time Out: 1030  Total Billable Time: 45 minutes    Precautions:  universal    Subjective     Pt reports: no new issues  he is compliant with home exercise program.  Response to previous treatment:good  Functional change: sustained use continues to improve  Pain: 0/10 rest; 2/10 light use  Location: diffuse      Hand  ache  Objective       Timed units:  3 therex                            Time:945-1030        Measurements:     Prom             Right        Left   Df/pf             80/84        80/90            Fluido: n/a since unit broken  U/s: n/a      UBE: level 1 x 10'    Stretches as tolerated-pain free: intrinsic rf, lf    Manual: n/a    Scar massage: n/a    PRE: green flex bar smiles/frowns 3 x 10; small splint stick yellow putty pull/push x 30, pushdowns x 30; yellow putty cones x 30; medium grippers 2 x 10      HEP: reviewed    Home Exercises and Education Provided     Education provided:     progress towards goals   likely tx progression  rationale of rehab interventions    Written Home Exercises Provided: no    Assessment     Mild tension at end range with above stretches    Pt would continue to benefit from skilled OT in order to facilitate full use and proper mechanics.    Dwayne is progressing well towards  his goals and there are no updates to goals at this time. Pt prognosis is good.  Pt will continue to benefit from skilled outpatient occupational therapy to address the deficits listed in the problem list on initial evaluation to provide pt/family education and to maximize pt's level of independence in the home and community environment.     Anticipated barriers to occupational therapy: edema, pain, stiffness, weakness    Pt's spiritual, cultural and educational needs considered and pt agreeable to plan of care and goals.    Goals:  stg to be met in 3 weeks 1. Patient will be I with HEP 2. Patient will have 2/10 pain with light use met 2-4-20 3. Patient will have = prom of bilateral wrist and hands  to enhance affected arm use with ADL        ltg to be met by d/c 1. Patient will be I with d/c HEP 2. Patient will have 1/10 pain with all use 3. Patient will have about 75% /pinch  on affected side vs unaffected side to promote full functional use                                                                          4. Patient will be I with all ADL                Any goals met today ?  (if any met see above)    Updates/Grading for next session: consider  and pinch test    Plan: edema control, stretching, ROM, patient education, PRE, HEP      Campbell Martinez, OT /CHT

## 2020-02-13 ENCOUNTER — CLINICAL SUPPORT (OUTPATIENT)
Dept: REHABILITATION | Facility: HOSPITAL | Age: 50
End: 2020-02-13
Attending: ORTHOPAEDIC SURGERY
Payer: COMMERCIAL

## 2020-02-13 DIAGNOSIS — M25.541 PAIN IN JOINT OF RIGHT HAND: ICD-10-CM

## 2020-02-13 DIAGNOSIS — M25.441 HAND JOINT EFFUSION, RIGHT: Primary | ICD-10-CM

## 2020-02-13 DIAGNOSIS — M25.641 STIFFNESS OF HAND JOINT, RIGHT: ICD-10-CM

## 2020-02-13 DIAGNOSIS — R29.898 RIGHT HAND WEAKNESS: ICD-10-CM

## 2020-02-13 PROCEDURE — 97110 THERAPEUTIC EXERCISES: CPT | Mod: PN

## 2020-02-13 NOTE — PROGRESS NOTES
Occupational Therapy Daily Treatment Note     Date: 2/13/2020  Name: Dwayne Candelario  [facility] Number: [id number]    Therapy Diagnosis:   Encounter Diagnoses   Name Primary?    Hand joint effusion, right Yes    Stiffness of hand joint, right     Right hand weakness     Pain in joint of right hand      Physician: Remigio Gagnon MD    Physician Orders: eval and tx  Medical Diagnosis: right 4th minimally displaced metacarpal shaft fx  Surgical Procedure and Date: n/a, n/a  Evaluation Date: 1/23/2020  Insurance Authorization Period Expiration: 12-30-19 thru 6-1-20  Plan of Care Certification Period: 1-23-20 thru 2-20-20  Date of Return to MD: see epic    Visit # / Visits authorized: 6 / 20  Time In:945  Time Out: 1030  Total Billable Time: 45 minutes    Precautions:  universal    Subjective     Pt reports: no new issues, sees ortho next week  he is compliant with home exercise program.  Response to previous treatment:good  Functional change: light tasks continue to improve, hasn't tested it yet with heavy use  Pain: 0/10 rest; 2/10 light use  Location: diffuse      Hand  ache  Objective       Timed units:  3 therex                            Time:945-1030        Measurements:                             II                III          key            3jaw                   tip  right             60#              65       22                19                     19  left              100              105       24                21                     20          Fluido: n/a since unit broken  U/s: n/a      UBE: level 1 x 10'    Stretches as tolerated-pain free: intrinsic rf, lf    Manual: n/a    Scar massage: n/a    PRE: green flex bar smiles/frowns 3 x 10; small splint stick yellow putty pull/push x 30, pushdowns x 30; yellow putty cones x 30; medium grippers 2 x 10      HEP: reviewed    Home Exercises and Education Provided     Education provided:     progress towards goals   likely tx progression  rationale of rehab  interventions    Written Home Exercises Provided: no    Assessment     Good decrease in pain and stiffness since day 1 of OT, progressive use should improve  force    Pt would continue to benefit from skilled OT in order to facilitate full use and proper mechanics.    Dwayne is progressing well towards his goals and there are no updates to goals at this time. Pt prognosis is good.  Pt will continue to benefit from skilled outpatient occupational therapy to address the deficits listed in the problem list on initial evaluation to provide pt/family education and to maximize pt's level of independence in the home and community environment.     Anticipated barriers to occupational therapy: edema, pain, stiffness, weakness    Pt's spiritual, cultural and educational needs considered and pt agreeable to plan of care and goals.    Goals:  stg to be met in 3 weeks 1. Patient will be I with HEP 2. Patient will have 2/10 pain with light use met 2-4-20 3. Patient will have = prom of bilateral wrist and hands  to enhance affected arm use with ADL        ltg to be met by d/c 1. Patient will be I with d/c HEP 2. Patient will have 1/10 pain with all use 3. Patient will have about 75% /pinch  on affected side vs unaffected side to promote full functional use                                                                          4. Patient will be I with all ADL                Any goals met today ?  (if any met see above)    Updates/Grading for next session: d/c soon likely    Plan: edema control, stretching, ROM, patient education, PRE, HEP      Campbell Martinez, OT /CHT

## 2020-02-18 ENCOUNTER — CLINICAL SUPPORT (OUTPATIENT)
Dept: REHABILITATION | Facility: HOSPITAL | Age: 50
End: 2020-02-18
Attending: ORTHOPAEDIC SURGERY
Payer: COMMERCIAL

## 2020-02-18 DIAGNOSIS — R29.898 RIGHT HAND WEAKNESS: ICD-10-CM

## 2020-02-18 DIAGNOSIS — M25.541 PAIN IN JOINT OF RIGHT HAND: ICD-10-CM

## 2020-02-18 DIAGNOSIS — M25.641 STIFFNESS OF HAND JOINT, RIGHT: ICD-10-CM

## 2020-02-18 DIAGNOSIS — M25.441 HAND JOINT EFFUSION, RIGHT: Primary | ICD-10-CM

## 2020-02-18 PROCEDURE — 97110 THERAPEUTIC EXERCISES: CPT | Mod: PN

## 2020-02-18 NOTE — PROGRESS NOTES
Occupational Therapy Daily Treatment Note     Date: 2/18/2020  Name: Dwayne Candelario  Clinic Number: [id number]    Therapy Diagnosis:   Encounter Diagnoses   Name Primary?    Hand joint effusion, right Yes    Stiffness of hand joint, right     Right hand weakness     Pain in joint of right hand      Physician: Remigio Gagnon MD    Physician Orders: eval and tx  Medical Diagnosis: right 4th minimally displaced metacarpal shaft fx  Surgical Procedure and Date: n/a, n/a  Evaluation Date: 1/23/2020  Insurance Authorization Period Expiration: 12-30-19 thru 6-1-20  Plan of Care Certification Period: 1-23-20 thru 2-20-20  Date of Return to MD: see epic    Visit # / Visits authorized: 7 / 20  Time In:945  Time Out: 1030  Total Billable Time: 45 minutes    Precautions:  universal    Subjective     Pt reports: has some numbness in right hand with sidelying and sustained grasp, understands to mention this to ortho  he is compliant with home exercise program.  Response to previous treatment:good  Functional change: light tasks continue to improve, hasn't tested it yet with heavy use  Pain: 0/10 rest; 2/10 light use  Location: diffuse      Hand  ache  Objective       Timed units:  3 therex                            Time:945-1030        Measurements:       n/a        Fluido: n/a since unit broken  U/s: n/a      UBE: level 1 x 10'    Stretches as tolerated-pain free: intrinsic rf, lf    Manual: n/a    Scar massage: n/a    PRE: green flex bar smiles/frowns 3 x 10; small splint stick yellow putty pull/push x 30, pushdowns x 30; yellow putty cones x 30; medium grippers 2 x 10      HEP: reviewed    Home Exercises and Education Provided     Education provided:     progress towards goals   likely tx progression  rationale of rehab interventions    Written Home Exercises Provided: no    Assessment     Clinic therapy likely will end soon pending  force test and ortho visit later this week    Pt would continue to benefit from  skilled OT in order to facilitate full use and proper mechanics.    Dwayne is progressing well towards his goals and there are no updates to goals at this time. Pt prognosis is good.  Pt will continue to benefit from skilled outpatient occupational therapy to address the deficits listed in the problem list on initial evaluation to provide pt/family education and to maximize pt's level of independence in the home and community environment.     Anticipated barriers to occupational therapy: edema, pain, stiffness, weakness    Pt's spiritual, cultural and educational needs considered and pt agreeable to plan of care and goals.    Goals:  stg to be met in 3 weeks 1. Patient will be I with HEP 2. Patient will have 2/10 pain with light use met 2-4-20 3. Patient will have = prom of bilateral wrist and hands  to enhance affected arm use with ADL        ltg to be met by d/c 1. Patient will be I with d/c HEP 2. Patient will have 1/10 pain with all use 3. Patient will have about 75% /pinch  on affected side vs unaffected side to promote full functional use                                                                          4. Patient will be I with all ADL                Any goals met today ?  (if any met see above)    Updates/Grading for next session: d/c soon likely    Plan: edema control, stretching, ROM, patient education, PRE, HEP      Campbell Martinez, OT /CHT

## 2020-02-20 ENCOUNTER — CLINICAL SUPPORT (OUTPATIENT)
Dept: REHABILITATION | Facility: HOSPITAL | Age: 50
End: 2020-02-20
Attending: ORTHOPAEDIC SURGERY
Payer: COMMERCIAL

## 2020-02-20 DIAGNOSIS — M25.641 STIFFNESS OF HAND JOINT, RIGHT: ICD-10-CM

## 2020-02-20 DIAGNOSIS — M25.541 PAIN IN JOINT OF RIGHT HAND: ICD-10-CM

## 2020-02-20 DIAGNOSIS — M25.441 HAND JOINT EFFUSION, RIGHT: Primary | ICD-10-CM

## 2020-02-20 DIAGNOSIS — R29.898 RIGHT HAND WEAKNESS: ICD-10-CM

## 2020-02-20 DIAGNOSIS — M79.641 RIGHT HAND PAIN: Primary | ICD-10-CM

## 2020-02-20 PROCEDURE — 97110 THERAPEUTIC EXERCISES: CPT | Mod: PN

## 2020-02-20 NOTE — PROGRESS NOTES
Occupational Therapy Daily Treatment Note     Date: 2/20/2020  Name: Dwayne Candelario  Clinic Number: [id number]    Therapy Diagnosis:   Encounter Diagnoses   Name Primary?    Hand joint effusion, right Yes    Stiffness of hand joint, right     Right hand weakness     Pain in joint of right hand      Physician: Remigio Gagnon MD    Physician Orders: eval and tx  Medical Diagnosis: right 4th minimally displaced metacarpal shaft fx  Surgical Procedure and Date: n/a, n/a  Evaluation Date: 1/23/2020  Insurance Authorization Period Expiration: 12-30-19 thru 6-1-20  Plan of Care Certification Period: 1-23-20 thru 2-20-20  Date of Return to MD: see epic    Visit # / Visits authorized: 8/ 20  Time In:930  Time Out: 1030  Total Billable Time: 45 minutes    Precautions:  universal    Subjective     Pt reports: doing all required tasks at home, sees ortho this week  he is compliant with home exercise program.  Response to previous treatment:good  Functional change:   Pain: 0/10 rest; 1/10 light use  Location: diffuse      Hand  ache  Objective       Timed units:  4 therex                            Time:930-1030        Measurements:                               II                III          key            3jaw                   tip  right             70#              80       22                19                     13  left              100              100       24                21                     20        Fluido: n/a   U/s: n/a      UBE: level 1 x 10'    Stretches as tolerated-pain free: intrinsic rf, lf    Manual: n/a    Scar massage: n/a    PRE: green flex bar smiles/frowns 3 x 10; small splint stick yellow putty pull/push x 30, pushdowns x 30; yellow putty cones x 30; medium grippers 2 x 10      HEP: reviewed    Home Exercises and Education Provided     Education provided:     Explained continued clinic therapy no longer indicated and to continue stretches for about a month or so    Written Home Exercises  Provided: no    Assessment       All goals met    Pt's spiritual, cultural and educational needs considered and pt agreeable to plan of care and goals.    Goals:  stg to be met in 3 weeks 1. Patient will be I with HEP 2. Patient will have 2/10 pain with light use met 2-4-20 3. Patient will have = prom of bilateral wrist and hands  to enhance affected arm use with ADL        ltg to be met by d/c 1. Patient will be I with d/c HEP 2. Patient will have 1/10 pain with all use 3. Patient will have about 75% /pinch  on affected side vs unaffected side to promote full functional use                                                                          4. Patient will be I with all ADL                Any goals met today ?  (if any met see above)        Plan: d/c      Campbell Martinez OT /CHT        Outpatient Therapy Discharge Summary     Name: Dwayne Candelario  [facility] Number: [id number]    Therapy Diagnosis:   Encounter Diagnoses   Name Primary?    Hand joint effusion, right Yes    Stiffness of hand joint, right     Right hand weakness     Pain in joint of right hand      Physician: Remigio Gagnon MD    Physician Orders: evaluate and tx  Medical Diagnosis: se eval  Evaluation Date: 1-23-20      Date of Last visit: 2-20-20  Total Visits Received: 8  Cancelled Visits: see epic  No Show Visits: see epic    Assessment    Goals: all met    Discharge reason: Patient has met all of his/her goals and Patient has reached the maximum rehab potential for the present time    Plan   This patient is discharged from Occupational Therapy

## 2020-02-21 ENCOUNTER — OFFICE VISIT (OUTPATIENT)
Dept: ORTHOPEDICS | Facility: CLINIC | Age: 50
End: 2020-02-21
Payer: COMMERCIAL

## 2020-02-21 ENCOUNTER — HOSPITAL ENCOUNTER (OUTPATIENT)
Dept: RADIOLOGY | Facility: HOSPITAL | Age: 50
Discharge: HOME | End: 2020-02-21
Attending: ORTHOPAEDIC SURGERY
Payer: COMMERCIAL

## 2020-02-21 VITALS — BODY MASS INDEX: 28.77 KG/M2 | RESPIRATION RATE: 18 BRPM | HEIGHT: 66 IN | WEIGHT: 179 LBS

## 2020-02-21 DIAGNOSIS — M79.641 RIGHT HAND PAIN: ICD-10-CM

## 2020-02-21 DIAGNOSIS — S62.324D CLOSED DISPLACED FRACTURE OF SHAFT OF FOURTH METACARPAL BONE OF RIGHT HAND WITH ROUTINE HEALING, SUBSEQUENT ENCOUNTER: Primary | ICD-10-CM

## 2020-02-21 PROCEDURE — 99213 OFFICE O/P EST LOW 20 MIN: CPT | Mod: S$GLB,,, | Performed by: ORTHOPAEDIC SURGERY

## 2020-02-21 PROCEDURE — 3008F BODY MASS INDEX DOCD: CPT | Mod: CPTII,S$GLB,, | Performed by: ORTHOPAEDIC SURGERY

## 2020-02-21 PROCEDURE — 99213 PR OFFICE/OUTPT VISIT, EST, LEVL III, 20-29 MIN: ICD-10-PCS | Mod: S$GLB,,, | Performed by: ORTHOPAEDIC SURGERY

## 2020-02-21 PROCEDURE — 3008F PR BODY MASS INDEX (BMI) DOCUMENTED: ICD-10-PCS | Mod: CPTII,S$GLB,, | Performed by: ORTHOPAEDIC SURGERY

## 2020-02-21 PROCEDURE — 73130 XR HAND COMPLETE 3 VIEW RIGHT: ICD-10-PCS | Mod: 26,RT,, | Performed by: RADIOLOGY

## 2020-02-21 PROCEDURE — 99999 PR PBB SHADOW E&M-EST. PATIENT-LVL III: ICD-10-PCS | Mod: PBBFAC,,, | Performed by: ORTHOPAEDIC SURGERY

## 2020-02-21 PROCEDURE — 99999 PR PBB SHADOW E&M-EST. PATIENT-LVL III: CPT | Mod: PBBFAC,,, | Performed by: ORTHOPAEDIC SURGERY

## 2020-02-21 PROCEDURE — 73130 X-RAY EXAM OF HAND: CPT | Mod: TC,PN,RT

## 2020-02-21 PROCEDURE — 73130 X-RAY EXAM OF HAND: CPT | Mod: 26,RT,, | Performed by: RADIOLOGY

## 2020-02-21 NOTE — PROGRESS NOTES
Past Medical History:   Diagnosis Date    Allergy     Hypertension        History reviewed. No pertinent surgical history.    Current Outpatient Medications   Medication Sig    ciprofloxacin-dexamethasone 0.3-0.1% (CIPRODEX) 0.3-0.1 % DrpS Place 4 drops into both ears 2 (two) times daily.    cyclobenzaprine (FLEXERIL) 10 MG tablet Take 1 tablet (10 mg total) by mouth every evening.    HYDROcodone-acetaminophen (NORCO) 5-325 mg per tablet Take 1 tablet by mouth every 6 (six) hours as needed for Pain.    losartan (COZAAR) 50 MG tablet Take 50 mg by mouth once daily.    naproxen (NAPROSYN) 500 MG tablet Take 1 tablet (500 mg total) by mouth 2 (two) times daily.    vitamin D 1000 units Tab Take 185 mg by mouth once daily.    levocetirizine (XYZAL) 5 MG tablet Take 1 tablet (5 mg total) by mouth every evening.    valacyclovir (VALTREX) 1000 MG tablet Take 1 tablet (1,000 mg total) by mouth 3 (three) times daily.     No current facility-administered medications for this visit.        Review of patient's allergies indicates:  No Known Allergies    Family History   Problem Relation Age of Onset    Cancer Father         colon    No Known Problems Mother     No Known Problems Sister     No Known Problems Brother     No Known Problems Maternal Aunt     No Known Problems Maternal Uncle     No Known Problems Paternal Aunt     No Known Problems Paternal Uncle     No Known Problems Maternal Grandmother     No Known Problems Maternal Grandfather     No Known Problems Paternal Grandmother     No Known Problems Paternal Grandfather     Amblyopia Neg Hx     Blindness Neg Hx     Cataracts Neg Hx     Diabetes Neg Hx     Glaucoma Neg Hx     Hypertension Neg Hx     Macular degeneration Neg Hx     Retinal detachment Neg Hx     Strabismus Neg Hx     Stroke Neg Hx     Thyroid disease Neg Hx        Social History     Socioeconomic History    Marital status:      Spouse name: Not on file    Number of  children: Not on file    Years of education: Not on file    Highest education level: Not on file   Occupational History    Not on file   Social Needs    Financial resource strain: Not on file    Food insecurity:     Worry: Not on file     Inability: Not on file    Transportation needs:     Medical: Not on file     Non-medical: Not on file   Tobacco Use    Smoking status: Never Smoker    Smokeless tobacco: Never Used   Substance and Sexual Activity    Alcohol use: No     Comment: occasional    Drug use: No    Sexual activity: Yes   Lifestyle    Physical activity:     Days per week: Not on file     Minutes per session: Not on file    Stress: Not on file   Relationships    Social connections:     Talks on phone: Not on file     Gets together: Not on file     Attends Buddhism service: Not on file     Active member of club or organization: Not on file     Attends meetings of clubs or organizations: Not on file     Relationship status: Not on file   Other Topics Concern    Not on file   Social History Narrative    Not on file       Chief Complaint:   Chief Complaint   Patient presents with    Right Hand - Injury, Pain       Consulting Physician: No ref. provider found    History of present illness:    This is a 49 y.o. year old male who complains of right hand pain following a fall on 10/03/2019. He comes in today reporting pain 0/10.    Review of Systems:    Constitution: Denies chills, fever, and sweats.  HENT: Denies headaches or blurry vision.  Cardiovascular: Denies chest pain or irregular heart beat.  Respiratory: Denies cough or shortness of breath.  Gastrointestinal: Denies abdominal pain, nausea, or vomiting.  Musculoskeletal:  Denies muscle cramps.  Neurological: Denies dizziness or focal weakness.  Psychiatric/Behavioral: Normal mental status.  Hematologic/Lymphatic: Denies bleeding problem or easy bruising/bleeding.  Skin: Denies rash or suspicious lesions.    Examination:    Vital Signs:   "  Vitals:    02/21/20 0944   Resp: 18   Weight: 81.2 kg (179 lb 0.2 oz)   Height: 5' 6" (1.676 m)   PainSc: 0-No pain   PainLoc: Hand       Body mass index is 28.89 kg/m².    This a well-developed, well nourished patient in no acute distress.    Alert and oriented x 3 and cooperative to examination.       Physical Exam: Right Hand Exam    Skin  Scars:   None  Rash:   None    Inspection  Erythema:  None  Bruising:  None  Swelling:  None  Masses:  None  Lymphadenopathy: None    Coordination:  Normal  Instability:  None    Range of Motion normal    Strength:  Near normal    Tenderness:  None    Pulse:   2+ radial  Capillary Refill: Normal    Sensation:  Intact            Imaging: X-rays ordered and images interpreted today personally by me of right hand shows a minimally displaced 4th metacarpal shaft fracture with healing.       Assessment: Closed displaced fracture of shaft of fourth metacarpal bone of right hand with routine healing, subsequent encounter        Plan: He is still reporting some  weakness and swelling. We will continue occupational therapy and re-evaluate him in 4 weeks.    DISCLAIMER: This note may have been dictated using voice recognition software and may contain grammatical errors.     NOTE: Consult report sent to referring provider via EPIC EMR.  "

## 2020-02-26 ENCOUNTER — TELEPHONE (OUTPATIENT)
Dept: ORTHOPEDICS | Facility: CLINIC | Age: 50
End: 2020-02-26

## 2020-02-26 NOTE — TELEPHONE ENCOUNTER
----- Message from Matthew Chang MA sent at 2/26/2020  9:30 AM CST -----  Contact: patient  Patient requesting the document for his medical update form for his employer that was faxed over, a copy for himself  Call back

## 2020-03-03 ENCOUNTER — OFFICE VISIT (OUTPATIENT)
Dept: PHYSICAL MEDICINE AND REHAB | Facility: CLINIC | Age: 50
End: 2020-03-03
Payer: COMMERCIAL

## 2020-03-03 ENCOUNTER — HOSPITAL ENCOUNTER (OUTPATIENT)
Dept: RADIOLOGY | Facility: HOSPITAL | Age: 50
Discharge: HOME | End: 2020-03-03
Attending: PHYSICAL MEDICINE & REHABILITATION
Payer: COMMERCIAL

## 2020-03-03 VITALS
BODY MASS INDEX: 28.77 KG/M2 | DIASTOLIC BLOOD PRESSURE: 88 MMHG | HEIGHT: 66 IN | SYSTOLIC BLOOD PRESSURE: 129 MMHG | HEART RATE: 67 BPM | WEIGHT: 179 LBS

## 2020-03-03 DIAGNOSIS — M79.601 PARESTHESIA AND PAIN OF BOTH UPPER EXTREMITIES: ICD-10-CM

## 2020-03-03 DIAGNOSIS — R20.2 PARESTHESIA AND PAIN OF BOTH UPPER EXTREMITIES: ICD-10-CM

## 2020-03-03 DIAGNOSIS — M79.602 PARESTHESIA AND PAIN OF BOTH UPPER EXTREMITIES: ICD-10-CM

## 2020-03-03 DIAGNOSIS — M54.2 CERVICALGIA: ICD-10-CM

## 2020-03-03 DIAGNOSIS — G56.03 BILATERAL CARPAL TUNNEL SYNDROME: Primary | ICD-10-CM

## 2020-03-03 PROCEDURE — 99999 PR PBB SHADOW E&M-EST. PATIENT-LVL III: ICD-10-PCS | Mod: PBBFAC,,, | Performed by: PHYSICAL MEDICINE & REHABILITATION

## 2020-03-03 PROCEDURE — 76882 US LMTD JT/FCL EVL NVASC XTR: CPT | Mod: S$GLB,,, | Performed by: PHYSICAL MEDICINE & REHABILITATION

## 2020-03-03 PROCEDURE — 3008F BODY MASS INDEX DOCD: CPT | Mod: CPTII,S$GLB,, | Performed by: PHYSICAL MEDICINE & REHABILITATION

## 2020-03-03 PROCEDURE — 99203 OFFICE O/P NEW LOW 30 MIN: CPT | Mod: 25,S$GLB,, | Performed by: PHYSICAL MEDICINE & REHABILITATION

## 2020-03-03 PROCEDURE — 72040 X-RAY EXAM NECK SPINE 2-3 VW: CPT | Mod: TC,FY

## 2020-03-03 PROCEDURE — 3074F PR MOST RECENT SYSTOLIC BLOOD PRESSURE < 130 MM HG: ICD-10-PCS | Mod: CPTII,S$GLB,, | Performed by: PHYSICAL MEDICINE & REHABILITATION

## 2020-03-03 PROCEDURE — 3079F PR MOST RECENT DIASTOLIC BLOOD PRESSURE 80-89 MM HG: ICD-10-PCS | Mod: CPTII,S$GLB,, | Performed by: PHYSICAL MEDICINE & REHABILITATION

## 2020-03-03 PROCEDURE — 3079F DIAST BP 80-89 MM HG: CPT | Mod: CPTII,S$GLB,, | Performed by: PHYSICAL MEDICINE & REHABILITATION

## 2020-03-03 PROCEDURE — 99999 PR PBB SHADOW E&M-EST. PATIENT-LVL III: CPT | Mod: PBBFAC,,, | Performed by: PHYSICAL MEDICINE & REHABILITATION

## 2020-03-03 PROCEDURE — 72040 X-RAY EXAM NECK SPINE 2-3 VW: CPT | Mod: 26,,, | Performed by: RADIOLOGY

## 2020-03-03 PROCEDURE — 72040 XR CERVICAL SPINE 2 OR 3 VIEWS: ICD-10-PCS | Mod: 26,,, | Performed by: RADIOLOGY

## 2020-03-03 PROCEDURE — 99203 PR OFFICE/OUTPT VISIT, NEW, LEVL III, 30-44 MIN: ICD-10-PCS | Mod: 25,S$GLB,, | Performed by: PHYSICAL MEDICINE & REHABILITATION

## 2020-03-03 PROCEDURE — 3008F PR BODY MASS INDEX (BMI) DOCUMENTED: ICD-10-PCS | Mod: CPTII,S$GLB,, | Performed by: PHYSICAL MEDICINE & REHABILITATION

## 2020-03-03 PROCEDURE — 3074F SYST BP LT 130 MM HG: CPT | Mod: CPTII,S$GLB,, | Performed by: PHYSICAL MEDICINE & REHABILITATION

## 2020-03-03 PROCEDURE — 76882 PR  US,EXTREMITY,NONVASCULAR,REAL-TIME IMAGE,LIMITED: ICD-10-PCS | Mod: S$GLB,,, | Performed by: PHYSICAL MEDICINE & REHABILITATION

## 2020-03-03 NOTE — LETTER
March 3, 2020      Remigio Gagnon MD  [address]  [address]  [place] [address]           [place] [facility]  [address]  [place] [address]  Phone: [phone]  Fax: [phone]          Patient: Dwayne Candelario   MR Number: [id number]   YOB: 1970   Date of Visit: 3/3/2020       Dear Dr. Remigio Gagnon:    Thank you for referring Dwayne Candelario to me for evaluation. Attached you will find relevant portions of my assessment and plan of care.    If you have questions, please do not hesitate to call me. I look forward to following Dwayne Candelario along with you.    Sincerely,    Dennis Celeste MD    Enclosure  CC:  No Recipients    If you would like to receive this communication electronically, please contact [email or web address] or [phone] to request more information on [employer] Link access.    For providers and/or their staff who would like to refer a patient to [facility], please contact us through our one-stop-shop provider referral line, [facility], at [phone].    If you feel you have received this communication in error or would no longer like to receive these types of communications, please e-mail [email or web address]

## 2020-03-03 NOTE — PROCEDURES
Procedures     Limited diagnostic ultrasound examination of the right and left wrist      Indications:  Wrist and hand pain and paresthesias    Findings:  Using a high frequency linear transducer on a Digital Lumens HS60, the volar right wrist was evaluated.  The median nerve cross-sectional area was evaluated at the proximal carpal tunnel and measured at 19 millimeters squared.  This was repeated on the left and measured at 20 millimeters squared.      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

## 2020-03-03 NOTE — PROGRESS NOTES
Today's Date: 03/03/2020     Referring Provider: Dr. Remigio Gagnon     Chief Complaint:   Chief Complaint   Patient presents with    Neck Pain       HPI: Dwayne Candelario is a 49 y.o. male  who presents today for evaluation and treatment of right and left hand numbness and neck pain.  He has a history of a right 4th metacarpal fracture status post fall.  He states that when he started physical therapy developed numbness in his hands right greater than left.  He states that the numbness is in the entire hand and on the right can radiate up the forearm to about the elbow.  On the left a can radiate up the distal forearm to approximately the latter 3rd of the distal radius and ulna.  Symptoms are worse with holding the steering wheel, sleeping, and prolonged usage of his hands.  He states that he will have to shake his hands out her hold him by his side in the symptoms will resolve within a few minutes.  He admits to weakness in the hands when the symptoms are present.    He also complains of neck pain.  Neck pain has been present for many years.  His around the C7-T1 paraspinals.  He describes the pain as a dull achy heavy pain worse with prolonged standing and walking and better with sitting and resting.  He denies any radicular symptoms down the upper extremities.  He admits to clicking and catching in the neck with rotation to the right more so than the left    Review of Systems   Constitutional: Negative for fever, malaise/fatigue and weight loss.   HENT: Negative for congestion and sore throat.    Eyes: Negative for double vision and photophobia.   Respiratory: Negative for sputum production and shortness of breath.    Cardiovascular: Negative for chest pain and leg swelling.   Gastrointestinal: Negative for constipation, diarrhea, nausea and vomiting.        No loss of bowel     Genitourinary: Negative for frequency and urgency.        No loss of bladder     Musculoskeletal: Positive for neck pain. Negative  for back pain and myalgias.   Skin: Negative for itching and rash.   Neurological: Positive for tingling, sensory change, weakness (Hand) and headaches. Negative for dizziness, tremors, speech change and focal weakness.   Endo/Heme/Allergies: Negative for environmental allergies. Does not bruise/bleed easily.   Psychiatric/Behavioral: Negative for depression. The patient is not nervous/anxious and does not have insomnia.         Social History     Socioeconomic History    Marital status:      Spouse name: Not on file    Number of children: Not on file    Years of education: Not on file    Highest education level: Not on file   Occupational History    Not on file   Social Needs    Financial resource strain: Not on file    Food insecurity:     Worry: Not on file     Inability: Not on file    Transportation needs:     Medical: Not on file     Non-medical: Not on file   Tobacco Use    Smoking status: Never Smoker    Smokeless tobacco: Never Used   Substance and Sexual Activity    Alcohol use: No     Comment: occasional    Drug use: No    Sexual activity: Yes   Lifestyle    Physical activity:     Days per week: Not on file     Minutes per session: Not on file    Stress: Not on file   Relationships    Social connections:     Talks on phone: Not on file     Gets together: Not on file     Attends [employer] service: Not on file     Active member of club or organization: Not on file     Attends meetings of clubs or organizations: Not on file     Relationship status: Not on file   Other Topics Concern    Not on file   Social History Narrative    Not on file       Family History   Problem Relation Age of Onset    Cancer Father         colon    No Known Problems Mother     No Known Problems Sister     No Known Problems Brother     No Known Problems Maternal Aunt     No Known Problems Maternal Uncle     No Known Problems Paternal Aunt     No Known Problems Paternal Uncle     No Known Problems  Maternal Grandmother     No Known Problems Maternal Grandfather     No Known Problems Paternal Grandmother     No Known Problems Paternal Grandfather     Amblyopia Neg Hx     Blindness Neg Hx     Cataracts Neg Hx     Diabetes Neg Hx     Glaucoma Neg Hx     Hypertension Neg Hx     Macular degeneration Neg Hx     Retinal detachment Neg Hx     Strabismus Neg Hx     Stroke Neg Hx     Thyroid disease Neg Hx        Current Outpatient Medications on File Prior to Visit   Medication Sig Dispense Refill    ciprofloxacin-dexamethasone 0.3-0.1% (CIPRODEX) 0.3-0.1 % DrpS Place 4 drops into both ears 2 (two) times daily. 7.5 mL 2    cyclobenzaprine (FLEXERIL) 10 MG tablet Take 1 tablet (10 mg total) by mouth every evening. 21 tablet 0    HYDROcodone-acetaminophen (NORCO) 5-325 mg per tablet Take 1 tablet by mouth every 6 (six) hours as needed for Pain. 10 tablet 0    levocetirizine (XYZAL) 5 MG tablet Take 1 tablet (5 mg total) by mouth every evening. 30 tablet 0    losartan (COZAAR) 50 MG tablet Take 50 mg by mouth once daily.      naproxen (NAPROSYN) 500 MG tablet Take 1 tablet (500 mg total) by mouth 2 (two) times daily. 30 tablet 0    valacyclovir (VALTREX) 1000 MG tablet Take 1 tablet (1,000 mg total) by mouth 3 (three) times daily. 21 tablet 0    vitamin D 1000 units Tab Take 185 mg by mouth once daily.       No current facility-administered medications on file prior to visit.         Review of patient's allergies indicates:  No Known Allergies    History reviewed. No pertinent surgical history.    Past Medical History:   Diagnosis Date    Allergy     Hypertension          Physical Exam   Constitutional: He is oriented to person, place, and time. He appears well-developed and well-nourished. No distress.   HENT:   Head: Normocephalic and atraumatic.   Eyes: Pupils are equal, round, and reactive to light. EOM are normal.   Neck: Muscular tenderness (C6-T1 paraspinals) present. No spinous process  tenderness present. Normal range of motion present.   Negative Spurling's bilaterally.  Positive facet loading bilaterally.   Pulmonary/Chest: Effort normal. No respiratory distress.   Abdominal: Normal appearance. He exhibits no distension.   Neurological: He is alert and oriented to person, place, and time. He has normal strength. No cranial nerve deficit or sensory deficit.   Reflex Scores:       Tricep reflexes are 2+ on the right side and 2+ on the left side.       Bicep reflexes are 2+ on the right side and 2+ on the left side.       Brachioradialis reflexes are 2+ on the right side and 2+ on the left side.       Patellar reflexes are 2+ on the right side and 2+ on the left side.  Negative Major's.  Negative clonus.   Skin: Skin is warm, dry and intact. No cyanosis. Nails show no clubbing.   Psychiatric: He has a normal mood and affect. His behavior is normal. Judgment and thought content normal.      Right Hand Exam     Tenderness   The patient is experiencing tenderness in the benson area.    Tests   Phalens sign: positive  Tinel's sign (median nerve): positive      Left Hand Exam     Tenderness   The patient is experiencing tenderness in the benson area.     Tests   Phalens sign: positive  Tinel's sign (median nerve): positive             Bilateral carpal tunnel syndrome  -     EMG W/ ULTRASOUND AND NERVE CONDUCTION TEST 2 Extremities; Future    Cervicalgia  -     X-Ray Cervical Spine 2 or 3 Views; Future; Expected date: 03/03/2020    Paresthesia and pain of both upper extremities           PLAN:   Dwayne Candelario is a very pleasant 49 y.o. male with right greater than left hand numbness and neck pain.  History, physical examination, limited diagnostic ultrasound of the bilateral volar wrist is consistent with median mononeuropathy at the wrist.  I suspect that this is the cause of his wrist and hand numbness.  He also has neck pain around the C6-T1 paraspinals.  Pain will radiate across the upper  trapezius.  His pain may be myofascial in nature or secondary to cervical spondylosis.  Physical exam did not reveal any upper motor neuron symptoms.  He has normal reflexes with negative Major's and negative clonus.  Gait is normal.  I will order a x-ray of the cervical spine to assess for cervical spondylosis or any gross abnormalities.  I will order and perform a EMG/NCS of the upper extremities to grade the carpal tunnel syndrome as well as assess for cervical radiculopathy.        Dennis Celeste D.O.  Physical Medicine and Rehabilitation          CC: Patients PCP: Darien Root MD  CC: Referring Provider: Dr. Remigio Gagnon

## 2020-03-10 ENCOUNTER — CLINICAL SUPPORT (OUTPATIENT)
Dept: REHABILITATION | Facility: HOSPITAL | Age: 50
End: 2020-03-10
Attending: ORTHOPAEDIC SURGERY
Payer: COMMERCIAL

## 2020-03-10 DIAGNOSIS — M25.541 PAIN IN JOINT OF RIGHT HAND: ICD-10-CM

## 2020-03-10 DIAGNOSIS — R29.898 RIGHT HAND WEAKNESS: ICD-10-CM

## 2020-03-10 DIAGNOSIS — M25.641 STIFFNESS OF HAND JOINT, RIGHT: ICD-10-CM

## 2020-03-10 DIAGNOSIS — M25.441 HAND JOINT EFFUSION, RIGHT: Primary | ICD-10-CM

## 2020-03-10 PROCEDURE — 97110 THERAPEUTIC EXERCISES: CPT | Mod: PN

## 2020-03-10 NOTE — PROGRESS NOTES
"  Occupational Therapy Daily Treatment Note     Date: 3/10/2020  Name: Dwayne Candelario  [facility] Number: [id number]    Therapy Diagnosis:   Encounter Diagnoses   Name Primary?    Hand joint effusion, right Yes    Stiffness of hand joint, right     Right hand weakness     Pain in joint of right hand      Physician: Remigio Gagnon MD    Physician Orders: eval and tx  Medical Diagnosis: right 4th minimally displaced metacarpal shaft fx  Surgical Procedure and Date: n/a, n/a  Evaluation Date: 1/23/2020  Insurance Authorization Period Expiration: 3-10-20 thru 12-31-20  Plan of Care Certification Period: 3-10-20 thru 4-7-20  Date of Return to MD: see epic    Visit # / Visits authorized: 1/12  Time In:10  Time Out: 1030  Total Billable Time: 30 minutes    Precautions:  universal    Subjective     Pt reports: reports feels like right hand weakness is still an issue with work tasks and dr. gagnon want him to continue OT (see last ortho note), had test for carpal tunnel syndrome since last OT visit, reports has intermittent tingling in right hand, understands to let me know if he feels like intensity or frequency of right hand tingling increases  he is compliant with home exercise program.  Response to previous treatment:good  Functional change:   Pain: 0/10 rest; 0/10 light use, no real pain with work tasks although had a "shock pain" of brief duration one time since last OT visit  Location: diffuse      Hand  ache  Objective       Timed units:  2 therex                            Time:        Measurements:       Full prom wrist and digits vs unaffected side                            II                III          key            3jaw                   tip  right             80#              85       22                21                     19  left               115            105       20                21                     19        Fluido: n/a   U/s: n/a      UBE: level 1 x 10'    Stretches as tolerated-pain " free: n/a    Manual: n/a    Scar massage: n/a    PRE: green flex bar smiles/frowns 3 x 10; yellow putty cones x 30; medium grippers 2 x 15      HEP: reviewed    Home Exercises and Education Provided     Education provided:     Explained we will focus on  force    Written Home Exercises Provided: no    Assessment       Good effort in clinic    Pt's spiritual, cultural and educational needs considered and pt agreeable to plan of care and goals.    Goals:          ltg to be met by d/c 1. Patient will be I with d/c HEP  2. Patient will have about 75% /pinch  on affected side vs unaffected side to promote full functional use  3. Patient will be I with all ADL                Any goals met today ?  (if any met see above)        Plan: prn tx      Campbell Martinez, OT /CHT        Outpatient Therapy Updated Plan of Care     Visit Date: 3/10/2020  Name: Dwayne Candealrio  Clinic Number: [id number]    Therapy Diagnosis:   Encounter Diagnoses   Name Primary?    Hand joint effusion, right Yes    Stiffness of hand joint, right     Right hand weakness     Pain in joint of right hand      Physician: Remigio Gagnon MD    Physician Orders: eval and tx  Medical Diagnosis: right closed nondisplaced fx 4th metacarpal fx  Evaluation Date: 1-23-20    Total Visits Received: 8  Cancelled Visits: see epic  No Show Visits: see epic    Current Certification Period:  3-10-20 to 4-7-20  Precautions:  universal  Visits from Evaluation Date:  9  Functional Level Prior to Evaluation:  Decreased rom, use, and strength; pre symptom onset had no deficits with functional use    Subjective     Update: reports concerned about strength for job tasks    Objective     Update: see above    Assessment     Update: may benefit from continued rehab to maximize strength and use and transition to d/c HEP    Previous Short Term Goals Status:   See above  New Short Term Goals Status:   n/a  Long Term Goal Status:   continue per initial plan of care.  Continue  any LTGs added in notes post initial eval  Reasons for Recertification of Therapy:   See above    Plan     Updated Certification Period: 3/10/2020 to 4-7-20  Recommended Treatment Plan: 2 times per week for 4 weeks: evaluate and tx      Campbell Martinez OT/CHT  3/10/2020            Outpatient Therapy Discharge Summary     5-27-20      Name: Dwayne Candelario  [facility] Number: [id number]    Therapy Diagnosis:   Encounter Diagnoses   Name Primary?    Hand joint effusion, right Yes    Stiffness of hand joint, right     Right hand weakness     Pain in joint of right hand      Physician: Remigio Gagnon MD    Physician Orders: evaluate and tx  Medical Diagnosis: see evaluation  Evaluation Date: see epic      Date of Last visit: 3-10-20  Total Visits Received: 9  Cancelled Visits: see epic  No Show Visits: see epic    Assessment    Goals: see today's note    Discharge reason: Patient has not attended therapy since 3-10-20; therapy put on hold due to COVID 19 therapy schedule procedure; has not scheduled visits to return/see phone log    Plan   This patient is discharged from Occupational Therapy

## 2020-03-11 ENCOUNTER — TELEPHONE (OUTPATIENT)
Dept: ORTHOPEDICS | Facility: CLINIC | Age: 50
End: 2020-03-11

## 2020-03-11 ENCOUNTER — OFFICE VISIT (OUTPATIENT)
Dept: URGENT CARE | Facility: CLINIC | Age: 50
End: 2020-03-11
Payer: COMMERCIAL

## 2020-03-11 VITALS
HEART RATE: 74 BPM | WEIGHT: 179 LBS | OXYGEN SATURATION: 99 % | BODY MASS INDEX: 28.77 KG/M2 | DIASTOLIC BLOOD PRESSURE: 82 MMHG | HEIGHT: 66 IN | SYSTOLIC BLOOD PRESSURE: 126 MMHG | TEMPERATURE: 101 F | RESPIRATION RATE: 16 BRPM

## 2020-03-11 DIAGNOSIS — R50.9 FEVER, UNSPECIFIED FEVER CAUSE: ICD-10-CM

## 2020-03-11 DIAGNOSIS — J06.9 VIRAL URI: Primary | ICD-10-CM

## 2020-03-11 LAB
CTP QC/QA: YES
FLUAV AG NPH QL: NEGATIVE
FLUBV AG NPH QL: NEGATIVE

## 2020-03-11 PROCEDURE — 99214 PR OFFICE/OUTPT VISIT, EST, LEVL IV, 30-39 MIN: ICD-10-PCS | Mod: 25,S$GLB,, | Performed by: NURSE PRACTITIONER

## 2020-03-11 PROCEDURE — 99214 OFFICE O/P EST MOD 30 MIN: CPT | Mod: 25,S$GLB,, | Performed by: NURSE PRACTITIONER

## 2020-03-11 PROCEDURE — 87804 INFLUENZA ASSAY W/OPTIC: CPT | Mod: QW,S$GLB,, | Performed by: NURSE PRACTITIONER

## 2020-03-11 PROCEDURE — 87804 POCT INFLUENZA A/B: ICD-10-PCS | Mod: QW,S$GLB,, | Performed by: NURSE PRACTITIONER

## 2020-03-11 RX ORDER — PROMETHAZINE HYDROCHLORIDE AND CODEINE PHOSPHATE 6.25; 1 MG/5ML; MG/5ML
5 SOLUTION ORAL EVERY 6 HOURS PRN
Qty: 140 ML | Refills: 0 | Status: SHIPPED | OUTPATIENT
Start: 2020-03-11 | End: 2020-03-21

## 2020-03-11 RX ORDER — IBUPROFEN 800 MG/1
800 TABLET ORAL
Qty: 20 TABLET | Refills: 0 | Status: SHIPPED | OUTPATIENT
Start: 2020-03-11

## 2020-03-11 NOTE — PROGRESS NOTES
"Subjective:       Patient ID: Dwayne Candelario is a 49 y.o. male.    Vitals:  height is 5' 6" (1.676 m) and weight is 81.2 kg (179 lb). His temperature is 100.7 °F (38.2 °C) (abnormal). His blood pressure is 126/82 and his pulse is 74. His respiration is 16 and oxygen saturation is 99%.     Chief Complaint: URI    Pt states yesterday he started off with a dry cough. This morning he developed head ache, body aches, chills, and fever. He did not take any medication today or yesterday for relief.  Denies chest pain, shortness of breath, abdominal pain, nausea vomiting diarrhea.  Denies history of asthma or COPD.    URI    This is a new problem. The current episode started yesterday. The problem has been unchanged. Associated symptoms include coughing and headaches. Pertinent negatives include no chest pain, congestion, diarrhea, dysuria, nausea, rash, sore throat or vomiting. He has tried nothing for the symptoms.       Constitution: Positive for fever. Negative for chills and fatigue.   HENT: Negative for congestion and sore throat.    Neck: Negative for painful lymph nodes.   Cardiovascular: Negative for chest pain and leg swelling.   Eyes: Negative for double vision and blurred vision.   Respiratory: Positive for cough. Negative for shortness of breath.    Gastrointestinal: Negative for nausea, vomiting and diarrhea.   Genitourinary: Negative for dysuria, frequency and urgency.   Musculoskeletal: Negative for joint pain, joint swelling, muscle cramps and muscle ache.   Skin: Negative for color change, pale and rash.   Allergic/Immunologic: Negative for seasonal allergies.   Neurological: Positive for headaches. Negative for dizziness, history of vertigo, light-headedness and passing out.   Hematologic/Lymphatic: Negative for swollen lymph nodes, easy bruising/bleeding and history of blood clots. Does not bruise/bleed easily.   Psychiatric/Behavioral: Negative for nervous/anxious, sleep disturbance and depression. The " patient is not nervous/anxious.        Objective:      Physical Exam   Constitutional: He is oriented to person, place, and time. He appears well-developed and well-nourished. He is cooperative.  Non-toxic appearance. He does not have a sickly appearance. He does not appear ill. No distress.   HENT:   Head: Normocephalic and atraumatic.   Right Ear: Hearing, tympanic membrane, external ear and ear canal normal.   Left Ear: Hearing, tympanic membrane, external ear and ear canal normal.   Nose: Rhinorrhea present. No mucosal edema or nasal deformity. No epistaxis. Right sinus exhibits no maxillary sinus tenderness and no frontal sinus tenderness. Left sinus exhibits no maxillary sinus tenderness and no frontal sinus tenderness.   Mouth/Throat: Uvula is midline, oropharynx is clear and moist and mucous membranes are normal. No trismus in the jaw. Normal dentition. No uvula swelling. Cobblestoning present. No oropharyngeal exudate, posterior oropharyngeal edema or posterior oropharyngeal erythema. No tonsillar exudate.   Eyes: Pupils are equal, round, and reactive to light. Conjunctivae and lids are normal. No scleral icterus.   Neck: Trachea normal, normal range of motion, full passive range of motion without pain and phonation normal. Neck supple. No neck rigidity. No edema and no erythema present.   Cardiovascular: Normal rate, regular rhythm, normal heart sounds, intact distal pulses and normal pulses.   Pulmonary/Chest: Effort normal and breath sounds normal. No respiratory distress. He has no decreased breath sounds. He has no wheezes. He has no rhonchi. He has no rales.   Abdominal: Normal appearance.   Musculoskeletal: Normal range of motion. He exhibits no edema or deformity.   Lymphadenopathy:     He has no cervical adenopathy.   Neurological: He is alert and oriented to person, place, and time. He exhibits normal muscle tone. Coordination normal.   Skin: Skin is warm, dry, intact, not diaphoretic and not  pale.   Psychiatric: He has a normal mood and affect. His speech is normal and behavior is normal. Judgment and thought content normal. Cognition and memory are normal.   Nursing note and vitals reviewed.    Results for orders placed or performed in visit on 03/11/20   POCT Influenza A/B   Result Value Ref Range    Rapid Influenza A Ag Negative Negative    Rapid Influenza B Ag Negative Negative     Acceptable Yes            Assessment:       1. Viral URI    2. Fever, unspecified fever cause        Plan:         Viral URI  -     ibuprofen (ADVIL,MOTRIN) 800 MG tablet; Take 1 tablet (800 mg total) by mouth every 6 to 8 hours as needed for Pain (or fever).  Dispense: 20 tablet; Refill: 0  -     promethazine-codeine 6.25-10 mg/5 ml (PHENERGAN WITH CODEINE) 6.25-10 mg/5 mL syrup; Take 5 mLs by mouth every 6 (six) hours as needed for Cough.  Dispense: 140 mL; Refill: 0    Fever, unspecified fever cause  -     POCT Influenza A/B        Reviewed previous pertinent office visits, PMH, PSH, fam hx  We discussed that this is likely a viral illness and will not benefit from antibiotics. Symptomatic care recommended.  Advised on return/follow-up precautions. Advised on ER precautions. Answered all patient questions. Patient verbalized understanding and voiced agreement with current treatment plan.    Patient Instructions     Viral Upper Respiratory Illness (Adult)  You have a viral upper respiratory illness (URI), which is another term for the common cold. This illness is contagious during the first few days. It is spread through the air by coughing and sneezing. It may also be spread by direct contact (touching the sick person and then touching your own eyes, nose, or mouth). Frequent handwashing will decrease risk of spread. Most viral illnesses go away within 7 to 10 days with rest and simple home remedies. Sometimes the illness may last for several weeks. Antibiotics will not kill a virus, and they are  generally not prescribed for this condition.    Home care  · If symptoms are severe, rest at home for the first 2 to 3 days. When you resume activity, don't let yourself get too tired.  · Avoid being exposed to cigarette smoke (yours or others).  · You may use acetaminophen or ibuprofen to control pain and fever, unless another medicine was prescribed. (Note: If you have chronic liver or kidney disease, have ever had a stomach ulcer or gastrointestinal bleeding, or are taking blood-thinning medicines, talk with your healthcare provider before using these medicines.) Aspirin should never be given to anyone under 18 years of age who is ill with a viral infection or fever. It may cause severe liver or brain damage.  · Your appetite may be poor, so a light diet is fine. Avoid dehydration by drinking 6 to 8 glasses of fluids per day (water, soft drinks, juices, tea, or soup). Extra fluids will help loosen secretions in the nose and lungs.  · Over-the-counter cold medicines will not shorten the length of time youre sick, but they may be helpful for the following symptoms: cough, sore throat, and nasal and sinus congestion. (Note: Do not use decongestants if you have high blood pressure.)  Follow-up care  Follow up with your healthcare provider, or as advised.  When to seek medical advice  Call your healthcare provider right away if any of these occur:  · Cough with lots of colored sputum (mucus)  · Severe headache; face, neck, or ear pain  · Difficulty swallowing due to throat pain  · Fever of 100.4°F (38°C)  Call 911, or get immediate medical care  Call emergency services right away if any of these occur:  · Chest pain, shortness of breath, wheezing, or difficulty breathing  · Coughing up blood  · Inability to swallow due to throat pain  Date Last Reviewed: 9/13/2015  © 6303-2940 [employer]. [address] [place] [address], [place] [address]. All rights reserved. This information is not intended as a substitute  for professional medical care. Always follow your healthcare professional's instructions.

## 2020-03-11 NOTE — PATIENT INSTRUCTIONS
Viral Upper Respiratory Illness (Adult)  You have a viral upper respiratory illness (URI), which is another term for the common cold. This illness is contagious during the first few days. It is spread through the air by coughing and sneezing. It may also be spread by direct contact (touching the sick person and then touching your own eyes, nose, or mouth). Frequent handwashing will decrease risk of spread. Most viral illnesses go away within 7 to 10 days with rest and simple home remedies. Sometimes the illness may last for several weeks. Antibiotics will not kill a virus, and they are generally not prescribed for this condition.    Home care  · If symptoms are severe, rest at home for the first 2 to 3 days. When you resume activity, don't let yourself get too tired.  · Avoid being exposed to cigarette smoke (yours or others).  · You may use acetaminophen or ibuprofen to control pain and fever, unless another medicine was prescribed. (Note: If you have chronic liver or kidney disease, have ever had a stomach ulcer or gastrointestinal bleeding, or are taking blood-thinning medicines, talk with your healthcare provider before using these medicines.) Aspirin should never be given to anyone under 18 years of age who is ill with a viral infection or fever. It may cause severe liver or brain damage.  · Your appetite may be poor, so a light diet is fine. Avoid dehydration by drinking 6 to 8 glasses of fluids per day (water, soft drinks, juices, tea, or soup). Extra fluids will help loosen secretions in the nose and lungs.  · Over-the-counter cold medicines will not shorten the length of time youre sick, but they may be helpful for the following symptoms: cough, sore throat, and nasal and sinus congestion. (Note: Do not use decongestants if you have high blood pressure.)  Follow-up care  Follow up with your healthcare provider, or as advised.  When to seek medical advice  Call your healthcare provider right away if any  of these occur:  · Cough with lots of colored sputum (mucus)  · Severe headache; face, neck, or ear pain  · Difficulty swallowing due to throat pain  · Fever of 100.4°F (38°C)  Call 911, or get immediate medical care  Call emergency services right away if any of these occur:  · Chest pain, shortness of breath, wheezing, or difficulty breathing  · Coughing up blood  · Inability to swallow due to throat pain  Date Last Reviewed: 9/13/2015  © 1760-7860 [employer]. [address] [place] [address]. All rights reserved. This information is not intended as a substitute for professional medical care. Always follow your healthcare professional's instructions.

## 2020-03-12 ENCOUNTER — TELEPHONE (OUTPATIENT)
Dept: ORTHOPEDICS | Facility: CLINIC | Age: 50
End: 2020-03-12

## 2020-03-12 NOTE — TELEPHONE ENCOUNTER
----- Message from Hua Torres sent at 3/12/2020  1:11 PM CDT -----  Contact: pt  --  [phone]  Is paperwork ready to be picked up

## 2020-03-12 NOTE — PLAN OF CARE
Outpatient Therapy Updated Plan of Care     Visit Date: 3/10/2020  Name: Dwayne Candelario  [facility] Number: [id number]    Therapy Diagnosis:   Encounter Diagnoses   Name Primary?    Hand joint effusion, right Yes    Stiffness of hand joint, right     Right hand weakness     Pain in joint of right hand      Physician: Remigio Gagnon MD    Physician Orders: eval and tx  Medical Diagnosis: right closed nondisplaced fx 4th metacarpal fx  Evaluation Date: 1-23-20    Total Visits Received: 8  Cancelled Visits: see epic  No Show Visits: see epic    Current Certification Period:  3-10-20 to 4-7-20  Precautions:  universal  Visits from Evaluation Date:  9  Functional Level Prior to Evaluation:  Decreased rom, use, and strength; pre symptom onset had no deficits with functional use    Subjective     Update: reports concerned about strength for job tasks    Objective     Update: see above    Assessment     Update: may benefit from continued rehab to maximize strength and use and transition to d/c HEP    Previous Short Term Goals Status:   See above  New Short Term Goals Status:   n/a  Long Term Goal Status:   continue per initial plan of care.  Continue any LTGs added in notes post initial eval  Reasons for Recertification of Therapy:   See above    Plan     Updated Certification Period: 3/10/2020 to 4-7-20  Recommended Treatment Plan: 2 times per week for 4 weeks: evaluate and tx      Campbell Martinez OT/CHT  3/10/2020

## 2020-03-12 NOTE — TELEPHONE ENCOUNTER
Detail Level: Detailed Spoke to patient. Advised paperwork can be picked up from the  at his convenience. Verbalized understanding.   Add 95521 Cpt? (Important Note: In 2017 The Use Of 03371 Is Being Tracked By Cms To Determine Future Global Period Reimbursement For Global Periods): yes

## 2020-03-13 ENCOUNTER — HOSPITAL ENCOUNTER (EMERGENCY)
Facility: HOSPITAL | Age: 50
Discharge: HOME | End: 2020-03-13
Attending: EMERGENCY MEDICINE
Payer: COMMERCIAL

## 2020-03-13 VITALS
BODY MASS INDEX: 27.32 KG/M2 | SYSTOLIC BLOOD PRESSURE: 125 MMHG | RESPIRATION RATE: 18 BRPM | WEIGHT: 170 LBS | HEART RATE: 85 BPM | OXYGEN SATURATION: 97 % | HEIGHT: 66 IN | TEMPERATURE: 100 F | DIASTOLIC BLOOD PRESSURE: 71 MMHG

## 2020-03-13 DIAGNOSIS — R05.9 COUGH: ICD-10-CM

## 2020-03-13 DIAGNOSIS — R50.9 ACUTE FEBRILE ILLNESS: Primary | ICD-10-CM

## 2020-03-13 DIAGNOSIS — B34.9 VIRAL SYNDROME: ICD-10-CM

## 2020-03-13 LAB
CTP QC/QA: YES
POC MOLECULAR INFLUENZA A AGN: NEGATIVE
POC MOLECULAR INFLUENZA B AGN: NEGATIVE

## 2020-03-13 PROCEDURE — 25000003 PHARM REV CODE 250: Performed by: PHYSICIAN ASSISTANT

## 2020-03-13 PROCEDURE — 87502 INFLUENZA DNA AMP PROBE: CPT

## 2020-03-13 PROCEDURE — 99284 EMERGENCY DEPT VISIT MOD MDM: CPT | Mod: 25

## 2020-03-13 RX ORDER — DEXTROMETHORPHAN HYDROBROMIDE, GUAIFENESIN 5; 100 MG/5ML; MG/5ML
650 LIQUID ORAL EVERY 8 HOURS
Qty: 30 TABLET | Refills: 0 | Status: SHIPPED | OUTPATIENT
Start: 2020-03-13

## 2020-03-13 RX ORDER — ACETAMINOPHEN 500 MG
1000 TABLET ORAL
Status: COMPLETED | OUTPATIENT
Start: 2020-03-13 | End: 2020-03-13

## 2020-03-13 RX ORDER — IBUPROFEN 400 MG/1
800 TABLET ORAL
Status: COMPLETED | OUTPATIENT
Start: 2020-03-13 | End: 2020-03-13

## 2020-03-13 RX ORDER — ONDANSETRON 4 MG/1
4 TABLET, FILM COATED ORAL EVERY 6 HOURS PRN
Qty: 12 TABLET | Refills: 0 | Status: SHIPPED | OUTPATIENT
Start: 2020-03-13

## 2020-03-13 RX ADMIN — ACETAMINOPHEN 1000 MG: 500 TABLET ORAL at 11:03

## 2020-03-13 RX ADMIN — IBUPROFEN 800 MG: 400 TABLET, FILM COATED ORAL at 12:03

## 2020-03-13 NOTE — DISCHARGE INSTRUCTIONS
You have a viral illness that is causing her symptoms.  It is possible that this is the Corona virus.  You may follow-up at one of the testing Centers.  When you arrive you may call the phone number on the sheet and they will come to your car.  He may return to the emergency department if you have shortness of breath, chest pain or any other concerning symptoms.  You may not leave your house until your symptom free

## 2020-03-13 NOTE — ED TRIAGE NOTES
"Pt. C/o of fever, headaches, generalized body aches, chills, hot flashes x5 days. Pt. Reports feeling "lightheaded and weak". Denies N/V/D. Denies taking meds prior to arrival.   "

## 2020-03-13 NOTE — ED PROVIDER NOTES
"Encounter Date: 3/13/2020    SCRIBE #1 NOTE: I, Shahla Cueva, am scribing for, and in the presence of,  Charley Shearer PA-C. I have scribed the following portions of the note - Other sections scribed: HPI, ROS.       History     Chief Complaint   Patient presents with    Generalized Body Aches     Pt c/o generalized body aches and chills. Seen at urgent care and negative for flu. Pt states "When I take the Ibuprofen, it makes me feel like nothing is wrong. But, when It wears off I feel bad all over again"      49 year old male patient presents to the ED complaining of generalized body aches onset 3 days ago. He also complains of fatigue, fever, and headache. He denies any appetite changes, sore throat, cough, nausea, or emesis. He states that he reported to an urgent care facility for these symptoms 2 days ago. He denies any recent travel or contact with anyone known to have COVID-19. He denies tobacco use.    The history is provided by the patient.     Review of patient's allergies indicates:  No Known Allergies  Past Medical History:   Diagnosis Date    Allergy     Hypertension      History reviewed. No pertinent surgical history.  Family History   Problem Relation Age of Onset    Cancer Father         colon    No Known Problems Mother     No Known Problems Sister     No Known Problems Brother     No Known Problems Maternal Aunt     No Known Problems Maternal Uncle     No Known Problems Paternal Aunt     No Known Problems Paternal Uncle     No Known Problems Maternal Grandmother     No Known Problems Maternal Grandfather     No Known Problems Paternal Grandmother     No Known Problems Paternal Grandfather     Amblyopia Neg Hx     Blindness Neg Hx     Cataracts Neg Hx     Diabetes Neg Hx     Glaucoma Neg Hx     Hypertension Neg Hx     Macular degeneration Neg Hx     Retinal detachment Neg Hx     Strabismus Neg Hx     Stroke Neg Hx     Thyroid disease Neg Hx      Social History "     Tobacco Use    Smoking status: Never Smoker    Smokeless tobacco: Never Used   Substance Use Topics    Alcohol use: No     Comment: occasional    Drug use: No     Review of Systems   Constitutional: Positive for fatigue and fever. Negative for appetite change.   HENT: Negative for sore throat.    Respiratory: Negative for cough.    Gastrointestinal: Negative for nausea and vomiting.   Musculoskeletal: Positive for myalgias.   Neurological: Positive for headaches.   All other systems reviewed and are negative.      Physical Exam     Initial Vitals [03/13/20 1004]   BP Pulse Resp Temp SpO2   (!) 123/91 79 18 100.3 °F (37.9 °C) 98 %      MAP       --         Physical Exam    Nursing note and vitals reviewed.  Constitutional: He appears well-developed and well-nourished. He is not diaphoretic. No distress.   HENT:   Head: Normocephalic and atraumatic.   Right Ear: External ear normal.   Left Ear: External ear normal.   Nose: Nose normal.   Mouth/Throat: Oropharynx is clear and moist.   Eyes: EOM are normal. Pupils are equal, round, and reactive to light.   Neck: Normal range of motion. Neck supple.   Cardiovascular: Normal rate.   Pulmonary/Chest: Breath sounds normal. No respiratory distress. He has no wheezes. He has no rhonchi. He has no rales.   Abdominal: Soft. Bowel sounds are normal. He exhibits no distension. There is no tenderness. There is no rebound and no guarding.   Musculoskeletal: Normal range of motion. He exhibits no edema or tenderness.   Neurological: He is alert and oriented to person, place, and time.   Skin: Skin is warm. Capillary refill takes less than 2 seconds.         ED Course   Procedures  Labs Reviewed   POCT INFLUENZA A/B MOLECULAR          Imaging Results          X-Ray Chest PA And Lateral (Final result)  Result time 03/13/20 11:54:53    Final result by Trenton Spangler MD (03/13/20 11:54:53)                 Impression:      Stable normal chest.      Electronically signed  by: Trenton Spangler MD  Date:    03/13/2020  Time:    11:54             Narrative:    EXAMINATION:  XR CHEST PA AND LATERAL    CLINICAL HISTORY:  Cough    TECHNIQUE:  PA and lateral views of the chest were performed.    COMPARISON:  Two thousand fifteen    FINDINGS:  Heart normal.  Lungs clear.                              X-Rays:   Independently Interpreted Readings:   Other Readings:  Chest x-ray reveals no acute cardiopulmonary processes.          APC / Resident Notes:   This is an urgent evaluation of a 49-year-old male presents to the emergency department with generalized body aches and flu-like and it was symptoms.  He states he was tested for the flu previously tested for the flu which was negative.    The patient is currently afebrile and nontoxic in appearance.  His vital signs are stable.  Physical exam is grossly unremarkable.  A rapid influenza and chest x-ray was performed.  Chest x-ray reveals no acute cardiopulmonary processes.  Rapid influenza was negative.  I will treat the patient supportively as I think he is a viral illness.  Strict and strong return precautions were reviewed.  We will follow up with his primary care physician.  I believe he is currently safe and stable for discharge at this time.         Scribe Attestation:   Scribe #1: I performed the above scribed service and the documentation accurately describes the services I performed. I attest to the accuracy of the note.                          Clinical Impression:       ICD-10-CM ICD-9-CM   1. Acute febrile illness R50.9 780.60   2. Cough R05 786.2   3. Viral syndrome B34.9 079.99         Disposition:   Disposition: Discharged  Condition: Stable     ED Disposition Condition    Discharge Stable        ED Prescriptions     Medication Sig Dispense Start Date End Date Auth. Provider    ondansetron (ZOFRAN) 4 MG tablet Take 1 tablet (4 mg total) by mouth every 6 (six) hours as needed. 12 tablet 3/13/2020  Charley Shearer PA-C     acetaminophen (TYLENOL) 650 MG TbSR Take 1 tablet (650 mg total) by mouth every 8 (eight) hours. 30 tablet 3/13/2020  Charley Shearer PA-C        Follow-up Information    None                       I, Charley Shearer PA-C, personally performed the services described in this documentation. All medical record entries made by the scribe were at my direction and in my presence. I have reviewed the chart and agree that the record reflects my personal performance and is accurate and complete.             Charley Shearer PA-C  03/14/20 9550

## 2020-03-24 ENCOUNTER — TELEPHONE (OUTPATIENT)
Dept: ORTHOPEDICS | Facility: CLINIC | Age: 50
End: 2020-03-24

## 2020-03-24 NOTE — TELEPHONE ENCOUNTER
Called patient, no answer. LVM advising will need to cancel his appointment scheduled on 4/3 due to Covid-19. Provider is only seeing patients with urgent issue. Advised will contact him when we are back to normal scheduling.

## 2020-04-03 ENCOUNTER — TELEPHONE (OUTPATIENT)
Dept: ORTHOPEDICS | Facility: CLINIC | Age: 50
End: 2020-04-03

## 2020-04-03 NOTE — TELEPHONE ENCOUNTER
Patient contacted. DOI 10/03/2019. Asking for letter; rescheduled f/u for 30 days post suspected COVID-19. Patient has been asymptomatic for 1.5 weeks as of today. Juliette Cline LPN

## 2020-04-03 NOTE — TELEPHONE ENCOUNTER
----- Message from Hua Torres sent at 4/3/2020  9:18 AM CDT -----  Contact: pt  --  [phone]  Needs a note for work, on medical leave , appt canceled

## 2020-04-03 NOTE — LETTER
April 3, 2020    Dwayne Candelario  [address]  [facility] [id number]         [facility] [place] BUSTOS [address] [place] [id number]  Phone: [phone] April 3, 2020     Patient: Dwayne Candelario   YOB: 1970   Date of Visit: 4/3/2020       To Whom It May Concern:    It is my medical opinion that Dwayne Candelario should remain out of work until released by physician. Next appointment 04/14/20.    If you have any questions or concerns, please don't hesitate to call.    Sincerely,        Remigio Gagnon MD

## 2020-04-08 DIAGNOSIS — M79.641 RIGHT HAND PAIN: Primary | ICD-10-CM

## 2020-04-13 ENCOUNTER — TELEPHONE (OUTPATIENT)
Dept: ORTHOPEDICS | Facility: CLINIC | Age: 50
End: 2020-04-13

## 2020-04-13 NOTE — TELEPHONE ENCOUNTER
----- Message from Sharon Perrin sent at 4/13/2020  9:17 AM CDT -----  Type: Needs Medical Advice  Who Called:  Patient   Best Call Back Number: [phone]  Additional Information: contact patient regarding rescheduling his 04/13/2020 appointment, first available is 06/01/2020, he also needs to a letter to his employer stating his 04/13/2020 visit has been canceled due to COVID 19 and reschedule in order to continue receiving his sick benefits.

## 2020-04-13 NOTE — TELEPHONE ENCOUNTER
Spoke to patient. Appointment rescheduled to 5/12 at 10am, per request. Pt verbalized understanding.

## 2020-04-13 NOTE — TELEPHONE ENCOUNTER
----- Message from Hua Torres sent at 4/13/2020 10:59 AM CDT -----  Contact: pt   Needs to re reshma in a few days

## 2020-04-13 NOTE — TELEPHONE ENCOUNTER
Returned call to patient, no answer. LVM advising his appointment is scheduled for tomorrow 4/14 at 10:30am. Please return call if he would like to reschedule.

## 2020-05-13 ENCOUNTER — HOSPITAL ENCOUNTER (OUTPATIENT)
Dept: RADIOLOGY | Facility: HOSPITAL | Age: 50
Discharge: HOME | End: 2020-05-13
Attending: ORTHOPAEDIC SURGERY
Payer: COMMERCIAL

## 2020-05-13 ENCOUNTER — OFFICE VISIT (OUTPATIENT)
Dept: ORTHOPEDICS | Facility: CLINIC | Age: 50
End: 2020-05-13
Payer: COMMERCIAL

## 2020-05-13 VITALS — HEIGHT: 66 IN | RESPIRATION RATE: 18 BRPM | TEMPERATURE: 99 F | WEIGHT: 170 LBS | BODY MASS INDEX: 27.32 KG/M2

## 2020-05-13 DIAGNOSIS — S62.324D CLOSED DISPLACED FRACTURE OF SHAFT OF FOURTH METACARPAL BONE OF RIGHT HAND WITH ROUTINE HEALING, SUBSEQUENT ENCOUNTER: Primary | ICD-10-CM

## 2020-05-13 DIAGNOSIS — M79.641 RIGHT HAND PAIN: ICD-10-CM

## 2020-05-13 PROCEDURE — 99213 OFFICE O/P EST LOW 20 MIN: CPT | Mod: S$GLB,,, | Performed by: ORTHOPAEDIC SURGERY

## 2020-05-13 PROCEDURE — 3008F BODY MASS INDEX DOCD: CPT | Mod: CPTII,S$GLB,, | Performed by: ORTHOPAEDIC SURGERY

## 2020-05-13 PROCEDURE — 73130 X-RAY EXAM OF HAND: CPT | Mod: TC,PN,RT

## 2020-05-13 PROCEDURE — 99213 PR OFFICE/OUTPT VISIT, EST, LEVL III, 20-29 MIN: ICD-10-PCS | Mod: S$GLB,,, | Performed by: ORTHOPAEDIC SURGERY

## 2020-05-13 PROCEDURE — 99999 PR PBB SHADOW E&M-EST. PATIENT-LVL III: ICD-10-PCS | Mod: PBBFAC,,, | Performed by: ORTHOPAEDIC SURGERY

## 2020-05-13 PROCEDURE — 73130 X-RAY EXAM OF HAND: CPT | Mod: 26,RT,, | Performed by: RADIOLOGY

## 2020-05-13 PROCEDURE — 99999 PR PBB SHADOW E&M-EST. PATIENT-LVL III: CPT | Mod: PBBFAC,,, | Performed by: ORTHOPAEDIC SURGERY

## 2020-05-13 PROCEDURE — 73130 XR HAND COMPLETE 3 VIEW RIGHT: ICD-10-PCS | Mod: 26,RT,, | Performed by: RADIOLOGY

## 2020-05-13 PROCEDURE — 3008F PR BODY MASS INDEX (BMI) DOCUMENTED: ICD-10-PCS | Mod: CPTII,S$GLB,, | Performed by: ORTHOPAEDIC SURGERY

## 2020-05-13 NOTE — PROGRESS NOTES
Past Medical History:   Diagnosis Date    Allergy     Hypertension        History reviewed. No pertinent surgical history.    Current Outpatient Medications   Medication Sig    acetaminophen (TYLENOL) 650 MG TbSR Take 1 tablet (650 mg total) by mouth every 8 (eight) hours.    ciprofloxacin-dexamethasone 0.3-0.1% (CIPRODEX) 0.3-0.1 % DrpS Place 4 drops into both ears 2 (two) times daily.    cyclobenzaprine (FLEXERIL) 10 MG tablet Take 1 tablet (10 mg total) by mouth every evening.    HYDROcodone-acetaminophen (NORCO) 5-325 mg per tablet Take 1 tablet by mouth every 6 (six) hours as needed for Pain.    ibuprofen (ADVIL,MOTRIN) 800 MG tablet Take 1 tablet (800 mg total) by mouth every 6 to 8 hours as needed for Pain (or fever).    losartan (COZAAR) 50 MG tablet Take 50 mg by mouth once daily.    naproxen (NAPROSYN) 500 MG tablet Take 1 tablet (500 mg total) by mouth 2 (two) times daily.    ondansetron (ZOFRAN) 4 MG tablet Take 1 tablet (4 mg total) by mouth every 6 (six) hours as needed.    vitamin D 1000 units Tab Take 185 mg by mouth once daily.    levocetirizine (XYZAL) 5 MG tablet Take 1 tablet (5 mg total) by mouth every evening.    valacyclovir (VALTREX) 1000 MG tablet Take 1 tablet (1,000 mg total) by mouth 3 (three) times daily.     No current facility-administered medications for this visit.        Review of patient's allergies indicates:  No Known Allergies    Family History   Problem Relation Age of Onset    Cancer Father         colon    No Known Problems Mother     No Known Problems Sister     No Known Problems Brother     No Known Problems Maternal Aunt     No Known Problems Maternal Uncle     No Known Problems Paternal Aunt     No Known Problems Paternal Uncle     No Known Problems Maternal Grandmother     No Known Problems Maternal Grandfather     No Known Problems Paternal Grandmother     No Known Problems Paternal Grandfather     Amblyopia Neg Hx     Blindness Neg Hx      Cataracts Neg Hx     Diabetes Neg Hx     Glaucoma Neg Hx     Hypertension Neg Hx     Macular degeneration Neg Hx     Retinal detachment Neg Hx     Strabismus Neg Hx     Stroke Neg Hx     Thyroid disease Neg Hx        Social History     Socioeconomic History    Marital status:      Spouse name: Not on file    Number of children: Not on file    Years of education: Not on file    Highest education level: Not on file   Occupational History    Not on file   Social Needs    Financial resource strain: Not on file    Food insecurity:     Worry: Not on file     Inability: Not on file    Transportation needs:     Medical: Not on file     Non-medical: Not on file   Tobacco Use    Smoking status: Never Smoker    Smokeless tobacco: Never Used   Substance and Sexual Activity    Alcohol use: No     Comment: occasional    Drug use: No    Sexual activity: Yes   Lifestyle    Physical activity:     Days per week: Not on file     Minutes per session: Not on file    Stress: Not on file   Relationships    Social connections:     Talks on phone: Not on file     Gets together: Not on file     Attends [employer] service: Not on file     Active member of club or organization: Not on file     Attends meetings of clubs or organizations: Not on file     Relationship status: Not on file   Other Topics Concern    Not on file   Social History Narrative    Not on file       Chief Complaint:   Chief Complaint   Patient presents with    Hand Injury     right hand 4th metacarpal fracture DOI 10/3/19       Consulting Physician: No ref. provider found    History of present illness:    This is a 49 y.o. year old male who complains of right hand pain following a fall on 10/03/2019. He comes in today reporting pain 0/10.    Review of Systems:    Constitution: Denies chills, fever, and sweats.  HENT: Denies headaches or blurry vision.  Cardiovascular: Denies chest pain or irregular heart beat.  Respiratory: Denies cough or  "shortness of breath.  Gastrointestinal: Denies abdominal pain, nausea, or vomiting.  Musculoskeletal:  Denies muscle cramps.  Neurological: Denies dizziness or focal weakness.  Psychiatric/Behavioral: Normal mental status.  Hematologic/Lymphatic: Denies bleeding problem or easy bruising/bleeding.  Skin: Denies rash or suspicious lesions.    Examination:    Vital Signs:    Vitals:    05/13/20 0949   Resp: 18   Temp: 99.2 °F (37.3 °C)   Weight: 77.1 kg (170 lb)   Height: 5' 6" (1.676 m)   PainSc: 0-No pain   PainLoc: Hand       Body mass index is 27.44 kg/m².    This a well-developed, well nourished patient in no acute distress.    Alert and oriented x 3 and cooperative to examination.       Physical Exam: Right Hand Exam    Skin  Scars:   None  Rash:   None    Inspection  Erythema:  None  Bruising:  None  Swelling:  None  Masses:  None  Lymphadenopathy: None    Coordination:  Normal  Instability:  None    Range of Motion normal    Strength:  Normal    Tenderness:  None    Pulse:   2+ radial  Capillary Refill: Normal    Sensation:  Intact            Imaging: X-rays ordered and images interpreted today personally by me of right hand shows a minimally displaced 4th metacarpal shaft fracture with healing.       Assessment: Closed displaced fracture of shaft of fourth metacarpal bone of right hand with routine healing, subsequent encounter        Plan:  Is doing well.  He has full range of motion and  with no pain.  At this point will give him a full release to return to work on 05/18/2020.  We will see him back as needed.    DISCLAIMER: This note may have been dictated using voice recognition software and may contain grammatical errors.     NOTE: Consult report sent to referring provider via EPIC EMR.  "

## 2020-05-25 ENCOUNTER — TELEPHONE (OUTPATIENT)
Dept: ORTHOPEDICS | Facility: CLINIC | Age: 50
End: 2020-05-25

## 2020-05-25 NOTE — TELEPHONE ENCOUNTER
----- Message from Celina Rodriguez MA sent at 5/25/2020 10:50 AM CDT -----  Contact: pt   States he needs release for full duty 05/18/2020  Mail to patient   Call back

## 2020-10-05 ENCOUNTER — PATIENT MESSAGE (OUTPATIENT)
Dept: ADMINISTRATIVE | Facility: HOSPITAL | Age: 50
End: 2020-10-05

## 2021-01-05 ENCOUNTER — PATIENT MESSAGE (OUTPATIENT)
Dept: ADMINISTRATIVE | Facility: HOSPITAL | Age: 51
End: 2021-01-05

## 2021-12-14 NOTE — PROGRESS NOTES
Occupational Therapy Daily Treatment Note     Date: 2/4/2020  Name: Dwayne Candelario  [facility] Number: [id number]    Therapy Diagnosis:   Encounter Diagnoses   Name Primary?    Hand joint effusion, right Yes    Stiffness of hand joint, right     Right hand weakness     Pain in joint of right hand      Physician: Remigio Gagnon MD    Physician Orders: eval and tx  Medical Diagnosis: right 4th minimally displaced metacarpal shaft fx  Surgical Procedure and Date: n/a, n/a  Evaluation Date: 1/23/2020  Insurance Authorization Period Expiration: 12-30-19 thru 6-1-20  Plan of Care Certification Period: 1-23-20 thru 2-20-20  Date of Return to MD: see epic    Visit # / Visits authorized: 4 / 20  Time In:945  Time Out: 1030  Total Billable Time: 45 minutes    Precautions:  universal    Subjective     Pt reports: no new issues  he is compliant with home exercise program.  Response to previous treatment:good  Functional change: able to fully close hand with ADL  Pain: 0/10 rest; 2/10 light use  Location: diffuse      Hand  ache  Objective       Timed units:  3 therex                            Time:945-1030    Untimed units:  fluido                           Time:930-945      Measurements: full prom all digits all planes                            II                III          key            3jaw                   tip  right             60#              60       22                20                     18  left               100            105       24                21                     20    Fluido: 15'  U/s: n/a      UBE: n/a    Stretches as tolerated-pain free: n/a    Manual: n/a    Scar massage: n/a    PRE: green flex bar smiles/frowns 3 x 10; small splint stick yellow putty pull/push x 30, pushdowns x 30; yellow putty cones x 30; medium grippers 2 x 10      HEP: reviewed    Home Exercises and Education Provided     Education provided:     progress towards goals   likely tx progression  rationale of rehab  Head, normocephalic, atraumatic, Face, Face within normal limits, Ears, External ears within normal limits, Nose/Nasopharynx, External nose  normal appearance, nares patent, no nasal discharge, Mouth and Throat, Oral cavity appearance normal, Breath odor normal, Lips, Appearance normal interventions    Written Home Exercises Provided: no    Assessment     Good flexibility noted all digits    Pt would continue to benefit from skilled OT in order to facilitate full use and proper mechanics.    Dwayne is progressing well towards his goals and there are no updates to goals at this time. Pt prognosis is good.  Pt will continue to benefit from skilled outpatient occupational therapy to address the deficits listed in the problem list on initial evaluation to provide pt/family education and to maximize pt's level of independence in the home and community environment.     Anticipated barriers to occupational therapy: edema, pain, stiffness, weakness    Pt's spiritual, cultural and educational needs considered and pt agreeable to plan of care and goals.    Goals:  stg to be met in 3 weeks 1. Patient will be I with HEP 2. Patient will have 2/10 pain with light use met 2-4-20 3. Patient will have = prom of bilateral wrist and hands  to enhance affected arm use with ADL        ltg to be met by d/c 1. Patient will be I with d/c HEP 2. Patient will have 1/10 pain with all use 3. Patient will have about 75% /pinch  on affected side vs unaffected side to promote full functional use                                                                          4. Patient will be I with all ADL                Any goals met today ?  (if any met see above)    Updates/Grading for next session: consider prom testing wrist and digits    Plan: edema control, stretching, ROM, patient education, PRE, HEP      Campbell Martinez OT /CHT

## 2022-11-17 ENCOUNTER — OFFICE VISIT (OUTPATIENT)
Dept: URGENT CARE | Facility: CLINIC | Age: 52
End: 2022-11-17

## 2022-11-17 VITALS
OXYGEN SATURATION: 96 % | BODY MASS INDEX: 30.22 KG/M2 | SYSTOLIC BLOOD PRESSURE: 166 MMHG | TEMPERATURE: 98 F | WEIGHT: 188 LBS | HEART RATE: 63 BPM | HEIGHT: 66 IN | RESPIRATION RATE: 18 BRPM | DIASTOLIC BLOOD PRESSURE: 97 MMHG

## 2022-11-17 DIAGNOSIS — Z76.89 ENCOUNTER TO ESTABLISH CARE WITH NEW DOCTOR: ICD-10-CM

## 2022-11-17 DIAGNOSIS — H61.21 RIGHT EAR IMPACTED CERUMEN: ICD-10-CM

## 2022-11-17 DIAGNOSIS — H92.01 RIGHT EAR PAIN: Primary | ICD-10-CM

## 2022-11-17 DIAGNOSIS — R03.0 ELEVATED BLOOD PRESSURE READING: ICD-10-CM

## 2022-11-17 PROCEDURE — 69209 REMOVE IMPACTED EAR WAX UNI: CPT | Mod: TIER,RT,S$GLB, | Performed by: FAMILY MEDICINE

## 2022-11-17 PROCEDURE — 69209 EAR CERUMEN REMOVAL: ICD-10-PCS | Mod: TIER,RT,S$GLB, | Performed by: FAMILY MEDICINE

## 2022-11-17 PROCEDURE — 99214 PR OFFICE/OUTPT VISIT, EST, LEVL IV, 30-39 MIN: ICD-10-PCS | Mod: TIER,25,S$GLB, | Performed by: FAMILY MEDICINE

## 2022-11-17 PROCEDURE — 99214 OFFICE O/P EST MOD 30 MIN: CPT | Mod: TIER,25,S$GLB, | Performed by: FAMILY MEDICINE

## 2022-11-17 NOTE — PROCEDURES
Ear Cerumen Removal    Date/Time: 11/17/2022 9:35 AM  Performed by: Monique Kiser NP  Authorized by: Monique Kiser NP     Ceruminolytics applied: Ceruminolytics applied prior to the procedure    Medication Used:  Cerumenex  Location details:  Right ear  Procedure type: curette and irrigation    Cerumen  Removal Results:  Cerumen completely removed  Patient tolerance:  Patient tolerated the procedure well with no immediate complications

## 2022-11-17 NOTE — PATIENT INSTRUCTIONS
General Discharge Instructions   PLEASE READ YOUR DISCHARGE INSTRUCTIONS ENTIRELY AS IT CONTAINS IMPORTANT INFORMATION.  If you were prescribed a narcotic or controlled medication, do not drive or operate heavy equipment or machinery while taking these medications.  If you were prescribed antibiotics, please take them to completion.  You must understand that you've received an Urgent Care treatment only and that you may be released before all your medical problems are known or treated. You, the patient, will arrange for follow up care as instructed.    OVER THE COUNTER RECOMMENDATIONS/SUGGESTIONS.    Make sure to stay well hydrated.    Use Nasal Saline to mechanically move any post nasal drip from your eustachian tube or from the back of your throat.    Use warm salt water gargles to ease your throat pain. Warm salt water gargles as needed for sore throat- 1/2 tsp salt to 1 cup warm water, gargle as desired.    Use an antihistamine such as Claritin, Zyrtec or Allegra to dry you out.    Use pseudoephedrine (behind the counter) to decongest. Pseudoephedrine 30 mg up to 240 mg /day. It can raise your blood pressure and give you palpitations.    Use mucinex (guaifenesin) to break up mucous up to 2400mg/day to loosen any mucous.    The mucinex DM pill has a cough suppressant that can be sedating. It can be used at night to stop the tickle at the back of your throat.    You can use Mucinex D (it has guaifenesin and a high dose of pseudoephedrine) in the mornings to help decongest.    Use Afrin in each nare for no longer than 3 days, as it is addictive. It can also dry out your mucous membranes and cause elevated blood pressure. This is especially useful if you are flying.    Use Flonase 1-2 sprays/nostril per day. It is a local acting steroid nasal spray, if you develop a bloody nose, stop using the medication immediately.    Sometimes Nyquil at night is beneficial to help you get some rest, however it is sedating and it  does have an antihistamine, and tylenol.    Honey is a natural cough suppressant that can be used.    Tylenol up to 4,000 mg a day is safe for short periods and can be used for body aches, pain, and fever. However in high doses and prolonged use it can cause liver irritation.    Ibuprofen is a non-steroidal anti-inflammatory that can be used for body aches, pain, and fever.However it can also cause stomach irritation if over used.     Follow up with your PCP or specialty clinic as instructed in the next 2-3 days if not improved or as needed. You can call [phone] to schedule an appointment with appropriate provider.      If you condition worsens, we recommend that you receive another evaluation at the emergency room immediately or contact your primary medical clinic's after hours call service to discuss your concerns.      Please return here or go to the Emergency Department for any concerns or worsening condition.   You can also call [phone] to schedule an appointment with the appropriate provider.    Please return here or go to the Emergency Department for any concerns or worsening of condition.    Thank you for choosing [facility]!    Our goal in the Urgent Care is to always provide outstanding medical care. You may receive a survey by mail or e-mail in the next week regarding your experience today. We would greatly appreciate you completing and returning the survey. Your feedback provides us with a way to recognize our staff who provide very good care, and it helps us learn how to improve when your experience was below our aspiration of excellence.      We appreciate you trusting us with your medical care. We hope you feel better soon. We will be happy to take care of you for all of your future medical needs.    Sincerely,    EILEEN Castano

## 2022-11-17 NOTE — PROGRESS NOTES
"Subjective:       Patient ID: Dwayne Candelario is a 52 y.o. male.    Vitals:  height is 5' 6" (1.676 m) and weight is 85.3 kg (188 lb). His temperature is 98.2 °F (36.8 °C). His blood pressure is 166/97 (abnormal) and his pulse is 63. His respiration is 18 and oxygen saturation is 96%.     Chief Complaint: Otalgia (RT Ear Pain)    Patient states he tried to flush right ear out with peroxide and his right ear feel like there is fluid stuck in it.   Provider note begins below:  Pt with no known pmh, not est with pcp, presents with complaints of right ear pain that started over the weekend, he says he tried to flush it twice without any relief.  Denies any history of recurrent ear infections in the past.  States he was told he had high blood pressure years ago but he has not taken any high blood pressure medications.    Otalgia   There is pain in the right ear. This is a recurrent problem. The current episode started in the past 7 days (11/12/2022). The problem occurs constantly. The problem has been rapidly worsening. There has been no fever. The pain is at a severity of 3/10. Associated symptoms include headaches. Pertinent negatives include no abdominal pain, coughing, diarrhea, ear discharge, hearing loss, neck pain, rash, rhinorrhea, sore throat or vomiting. He has tried nothing for the symptoms. The treatment provided no relief.     HENT:  Positive for ear pain. Negative for ear discharge, hearing loss and sore throat.    Neck: Negative for neck pain.   Respiratory:  Negative for cough.    Gastrointestinal:  Negative for abdominal pain, vomiting and diarrhea.   Skin:  Negative for rash.   Neurological:  Positive for headaches.     Objective:      Physical Exam   Constitutional: He is oriented to person, place, and time. He appears well-developed.  Non-toxic appearance. He does not appear ill. No distress.   HENT:   Head: Normocephalic and atraumatic.   Ears:   Right Ear: External ear normal. There is cerumen " present. No no drainage, swelling or tenderness. No decreased hearing is noted.   Left Ear: External ear normal. No no drainage, swelling, tenderness or cerumen not present. No foreign bodies. No mastoid tenderness. Tympanic membrane is not injected, not perforated, not erythematous, not retracted and not bulging.  No middle ear effusion.   Nose: Nose normal.   Mouth/Throat: Oropharynx is clear and moist.   Eyes: Conjunctivae, EOM and lids are normal. Pupils are equal, round, and reactive to light.   Neck: Trachea normal and phonation normal. Neck supple.   Musculoskeletal: Normal range of motion.         General: Normal range of motion.   Neurological: He is alert and oriented to person, place, and time.   Skin: Skin is warm, dry, intact and not diaphoretic.   Psychiatric: His speech is normal and behavior is normal. Judgment and thought content normal.   Nursing note and vitals reviewed.      Assessment:       1. Right ear pain    2. Elevated blood pressure reading    3. Encounter to establish care with new doctor    4. Right ear impacted cerumen        Cerumen Removal performed by MA    Liquid Colace applied to right external Auditory Canal    Right External Canal irrigated with sterile water    Cerumen completely removed    External canal wnl    TM wnl    Patient tolerated procedure well, reports relief of symptoms    Plan:       Patient reports relief of symptoms after wax was removed from right ear.  TM within normal limits.  Advised on over-the-counter treatments for ear wax removal.    Discussed results/diagnosis/plan with patient in clinic. Strict precautions given to patient to monitor for worsening signs and symptoms. Advised to follow up with PCP or specialist.    Explained side effects of medications prescribed with patient and informed him/her to discontinue use if he/she has any side effects and to inform UC or PCP if this occurs. All questions answered. Strict ED verses clinic return precautions  stressed and given in depth. Advised if symptoms worsens of fail to improve he/she should go to the Emergency Room. Discharge and follow-up instructions given verbally/printed with the patient who expressed understanding and willingness to comply with my recommendations. Patient voiced understanding and in agreement with current treatment plan. Patient exits the exam room in no acute distress. Conversant and engaged during discharge discussion, verbalized understanding.      Bp management reviewed, resources provided for pcp    Right ear pain  -     Ear wax removal    Elevated blood pressure reading    Encounter to establish care with new doctor  -     Ambulatory referral/consult to [facility]    Right ear impacted cerumen               Patient Instructions   General Discharge Instructions   PLEASE READ YOUR DISCHARGE INSTRUCTIONS ENTIRELY AS IT CONTAINS IMPORTANT INFORMATION.  If you were prescribed a narcotic or controlled medication, do not drive or operate heavy equipment or machinery while taking these medications.  If you were prescribed antibiotics, please take them to completion.  You must understand that you've received an Urgent Care treatment only and that you may be released before all your medical problems are known or treated. You, the patient, will arrange for follow up care as instructed.    OVER THE COUNTER RECOMMENDATIONS/SUGGESTIONS.    Make sure to stay well hydrated.    Use Nasal Saline to mechanically move any post nasal drip from your eustachian tube or from the back of your throat.    Use warm salt water gargles to ease your throat pain. Warm salt water gargles as needed for sore throat- 1/2 tsp salt to 1 cup warm water, gargle as desired.    Use an antihistamine such as Claritin, Zyrtec or Allegra to dry you out.    Use pseudoephedrine (behind the counter) to decongest. Pseudoephedrine 30 mg up to 240 mg /day. It can raise your blood pressure and give you palpitations.    Use mucinex  (guaifenesin) to break up mucous up to 2400mg/day to loosen any mucous.    The mucinex DM pill has a cough suppressant that can be sedating. It can be used at night to stop the tickle at the back of your throat.    You can use Mucinex D (it has guaifenesin and a high dose of pseudoephedrine) in the mornings to help decongest.    Use Afrin in each nare for no longer than 3 days, as it is addictive. It can also dry out your mucous membranes and cause elevated blood pressure. This is especially useful if you are flying.    Use Flonase 1-2 sprays/nostril per day. It is a local acting steroid nasal spray, if you develop a bloody nose, stop using the medication immediately.    Sometimes Nyquil at night is beneficial to help you get some rest, however it is sedating and it does have an antihistamine, and tylenol.    Honey is a natural cough suppressant that can be used.    Tylenol up to 4,000 mg a day is safe for short periods and can be used for body aches, pain, and fever. However in high doses and prolonged use it can cause liver irritation.    Ibuprofen is a non-steroidal anti-inflammatory that can be used for body aches, pain, and fever.However it can also cause stomach irritation if over used.     Follow up with your PCP or specialty clinic as instructed in the next 2-3 days if not improved or as needed. You can call [phone] to schedule an appointment with appropriate provider.      If you condition worsens, we recommend that you receive another evaluation at the emergency room immediately or contact your primary medical clinic's after hours call service to discuss your concerns.      Please return here or go to the Emergency Department for any concerns or worsening condition.   You can also call [phone] to schedule an appointment with the appropriate provider.    Please return here or go to the Emergency Department for any concerns or worsening of condition.    Thank you for choosing [facility]!    Our goal in the Urgent Care is to always provide outstanding medical care. You may receive a survey by mail or e-mail in the next week regarding your experience today. We would greatly appreciate you completing and returning the survey. Your feedback provides us with a way to recognize our staff who provide very good care, and it helps us learn how to improve when your experience was below our aspiration of excellence.      We appreciate you trusting us with your medical care. We hope you feel better soon. We will be happy to take care of you for all of your future medical needs.    Sincerely,    EILEEN Castano

## 2023-10-06 ENCOUNTER — OFFICE VISIT (OUTPATIENT)
Dept: URGENT CARE | Facility: CLINIC | Age: 53
End: 2023-10-06

## 2023-10-06 VITALS
OXYGEN SATURATION: 95 % | HEART RATE: 90 BPM | HEIGHT: 67 IN | TEMPERATURE: 98 F | SYSTOLIC BLOOD PRESSURE: 145 MMHG | BODY MASS INDEX: 29.52 KG/M2 | WEIGHT: 188.06 LBS | DIASTOLIC BLOOD PRESSURE: 91 MMHG | RESPIRATION RATE: 16 BRPM

## 2023-10-06 DIAGNOSIS — J30.1 SEASONAL ALLERGIC RHINITIS DUE TO POLLEN: Primary | ICD-10-CM

## 2023-10-06 PROCEDURE — 96372 THER/PROPH/DIAG INJ SC/IM: CPT | Mod: S$GLB,,, | Performed by: NURSE PRACTITIONER

## 2023-10-06 PROCEDURE — 99213 OFFICE O/P EST LOW 20 MIN: CPT | Mod: 25,S$GLB,, | Performed by: NURSE PRACTITIONER

## 2023-10-06 PROCEDURE — 99213 PR OFFICE/OUTPT VISIT, EST, LEVL III, 20-29 MIN: ICD-10-PCS | Mod: 25,S$GLB,, | Performed by: NURSE PRACTITIONER

## 2023-10-06 PROCEDURE — 96372 PR INJECTION,THERAP/PROPH/DIAG2ST, IM OR SUBCUT: ICD-10-PCS | Mod: S$GLB,,, | Performed by: NURSE PRACTITIONER

## 2023-10-06 RX ORDER — DEXAMETHASONE SODIUM PHOSPHATE 100 MG/10ML
8 INJECTION INTRAMUSCULAR; INTRAVENOUS
Status: COMPLETED | OUTPATIENT
Start: 2023-10-06 | End: 2023-10-06

## 2023-10-06 RX ADMIN — DEXAMETHASONE SODIUM PHOSPHATE 8 MG: 100 INJECTION INTRAMUSCULAR; INTRAVENOUS at 01:10

## 2023-10-06 NOTE — PROGRESS NOTES
"Subjective:      Patient ID: Dwayne Candelario is a 53 y.o. male.    Vitals:  height is 5' 7" (1.702 m) and weight is 85.3 kg (188 lb 0.8 oz). His oral temperature is 98.4 °F (36.9 °C). His blood pressure is 145/91 (abnormal) and his pulse is 90. His respiration is 16 and oxygen saturation is 95%.     Chief Complaint: Sinus Problem    Pt complains of congestion and sneezing that started 1 week ago. Pt he suffers with seasonal allergies.    Provider note begins below    Patient denies fevers, headaches, or body aches.  Used to happened difficulty sleeping due the sinus congestion.  He states the only thing that works for him is a steroid injection.  He is not had 1 in the year.  Medicines do not help.  Symptoms for a week and a half.    Sinus Problem  This is a new problem. The current episode started 1 to 4 weeks ago. The problem is unchanged. There has been no fever. His pain is at a severity of 0/10. He is experiencing no pain. Associated symptoms include congestion, sinus pressure and sneezing. Pertinent negatives include no coughing, diaphoresis, headaches, shortness of breath or sore throat. Past treatments include nothing. The treatment provided no relief.       Constitution: Negative for sweating, fatigue and fever.   HENT:  Positive for congestion and sinus pressure. Negative for sore throat.    Cardiovascular:  Negative for chest pain and sob on exertion.   Respiratory:  Negative for cough and shortness of breath.    Gastrointestinal:  Negative for nausea, vomiting, constipation and diarrhea.   Skin:  Negative for rash.   Allergic/Immunologic: Positive for sneezing.   Neurological:  Negative for headaches.      Objective:     Physical Exam   Constitutional: He is oriented to person, place, and time.   HENT:   Head: Normocephalic and atraumatic.   Ears:   Right Ear: Tympanic membrane, external ear and ear canal normal.   Left Ear: Tympanic membrane, external ear and ear canal normal.   Nose: Rhinorrhea and " congestion present.   Cardiovascular: Normal rate and regular rhythm.   Pulmonary/Chest: Effort normal and breath sounds normal. No stridor. No respiratory distress. He has no wheezes. He has no rhonchi. He has no rales. He exhibits no tenderness.   Abdominal: Normal appearance.   Neurological: He is alert and oriented to person, place, and time.   Skin: Skin is dry.   Psychiatric: His behavior is normal. Mood normal.       Assessment:     1. Seasonal allergic rhinitis due to pollen        Plan:   Patient requesting a steroid injection  Recommend Flonase  Given risks and benefits of steroid injection.    You received a steroid shot today - this can elevate your blood pressure, elevate your blood sugar, cause water weight gain, nervous energy, redness to the face and dimpling of the skin where the shot goes in.  Pt agrees with proceeding.      Seasonal allergic rhinitis due to pollen  -     dexAMETHasone injection 8 mg

## 2023-11-01 ENCOUNTER — HOSPITAL ENCOUNTER (EMERGENCY)
Facility: HOSPITAL | Age: 53
Discharge: HOME | End: 2023-11-01
Attending: EMERGENCY MEDICINE

## 2023-11-01 VITALS
HEART RATE: 75 BPM | TEMPERATURE: 98 F | OXYGEN SATURATION: 98 % | SYSTOLIC BLOOD PRESSURE: 135 MMHG | BODY MASS INDEX: 28.25 KG/M2 | WEIGHT: 180 LBS | RESPIRATION RATE: 18 BRPM | DIASTOLIC BLOOD PRESSURE: 89 MMHG | HEIGHT: 67 IN

## 2023-11-01 DIAGNOSIS — M25.511 ACUTE PAIN OF RIGHT SHOULDER: ICD-10-CM

## 2023-11-01 DIAGNOSIS — M79.672 LEFT FOOT PAIN: ICD-10-CM

## 2023-11-01 DIAGNOSIS — L03.119 CELLULITIS OF FOOT: Primary | ICD-10-CM

## 2023-11-01 PROCEDURE — 99284 EMERGENCY DEPT VISIT MOD MDM: CPT | Mod: ER

## 2023-11-01 RX ORDER — NAPROXEN 500 MG/1
500 TABLET ORAL 2 TIMES DAILY WITH MEALS
Qty: 10 TABLET | Refills: 0 | Status: SHIPPED | OUTPATIENT
Start: 2023-11-01 | End: 2023-11-06

## 2023-11-01 RX ORDER — CEPHALEXIN 500 MG/1
500 CAPSULE ORAL 4 TIMES DAILY
Qty: 20 CAPSULE | Refills: 0 | Status: SHIPPED | OUTPATIENT
Start: 2023-11-01 | End: 2023-11-06

## 2023-11-01 NOTE — ED PROVIDER NOTES
Encounter Date: 11/1/2023    SCRIBE #1 NOTE: ILiang, am scribing for, and in the presence of,  Kate Flores PA-C.       History     Chief Complaint   Patient presents with    Foot Pain     Pt complains of left foot pain with swelling for about 4 days. Pain is increasing and radiating up left leg. Denies trauma/injury. Denies taking otc medications.      Dwayne Candelario is a 53 y.o. male with a PMHx of HTN, who presents to the ED for evaluation of left foot swelling and pain beginning on Sunday. Patient reports complaints of pain and swelling to his left foot since Sunday, noting his pain radiates up his left lower leg upon ambulation. He denies recent falls or traumas. He denies history of similar symptoms. No medications taken PTA. No alleviating or exacerbating factors noted. Denies fever, CP, SOB, N/V, or other associated symptoms. NKDA.    The history is provided by the patient. No  was used.     Review of patient's allergies indicates:  No Known Allergies  Past Medical History:   Diagnosis Date    Allergy     Hypertension      No past surgical history on file.  Family History   Problem Relation Age of Onset    Cancer Father         colon    No Known Problems Mother     No Known Problems Sister     No Known Problems Brother     No Known Problems Maternal Aunt     No Known Problems Maternal Uncle     No Known Problems Paternal Aunt     No Known Problems Paternal Uncle     No Known Problems Maternal Grandmother     No Known Problems Maternal Grandfather     No Known Problems Paternal Grandmother     No Known Problems Paternal Grandfather     Amblyopia Neg Hx     Blindness Neg Hx     Cataracts Neg Hx     Diabetes Neg Hx     Glaucoma Neg Hx     Hypertension Neg Hx     Macular degeneration Neg Hx     Retinal detachment Neg Hx     Strabismus Neg Hx     Stroke Neg Hx     Thyroid disease Neg Hx      Social History     Tobacco Use    Smoking status: Never    Smokeless tobacco: Never    Substance Use Topics    Alcohol use: No     Comment: occasional    Drug use: No     Review of Systems   Constitutional:  Negative for chills, fatigue and fever.   HENT:  Negative for congestion, sinus pressure, sinus pain, sneezing and sore throat.    Eyes:  Negative for visual disturbance.   Respiratory:  Negative for cough and shortness of breath.    Cardiovascular:  Negative for chest pain.   Gastrointestinal:  Negative for abdominal pain, nausea and vomiting.   Genitourinary:  Negative for difficulty urinating.   Musculoskeletal:  Positive for myalgias (left foot). Negative for back pain.   Skin:  Negative for rash.   Neurological:  Negative for syncope and headaches.       Physical Exam     Initial Vitals [11/01/23 1644]   BP Pulse Resp Temp SpO2   (!) 139/93 72 18 97.8 °F (36.6 °C) 98 %      MAP       --         Physical Exam    Nursing note and vitals reviewed.  Constitutional: He appears well-developed and well-nourished. He is not diaphoretic. No distress.   HENT:   Head: Normocephalic and atraumatic.   Right Ear: External ear normal.   Left Ear: External ear normal.   Cardiovascular:  Normal rate, regular rhythm and intact distal pulses.           Pulses:       Dorsalis pedis pulses are 2+ on the left side.   Pulmonary/Chest: Breath sounds normal. No respiratory distress.   Abdominal: He exhibits no distension.   Musculoskeletal:         General: No edema.      Comments: Mild swelling and erythema to the distal mid left foot with mild tenderness to palpation.     Neurological: He is alert and oriented to person, place, and time. GCS score is 15. GCS eye subscore is 4. GCS verbal subscore is 5. GCS motor subscore is 6.   Normal strength and sensation to the left foot noted.    Skin: Skin is warm and dry. Capillary refill takes less than 2 seconds.        Psychiatric: He has a normal mood and affect. His behavior is normal. Judgment normal.         ED Course   Procedures  Labs Reviewed - No data to  display       Imaging Results              X-Ray Foot Complete Left (Final result)  Result time 11/01/23 17:10:43      Final result by Hamlet Bond MD (11/01/23 17:10:43)                   Impression:      No acute radiographic abnormality.      Electronically signed by: Hamlet Bond  Date:    11/01/2023  Time:    17:10               Narrative:    EXAMINATION:  XR FOOT COMPLETE 3 VIEW LEFT    CLINICAL HISTORY:  .  Pain in left foot, location not otherwise specified.    TECHNIQUE:  AP, lateral and oblique views of the left foot were performed.    COMPARISON:  None    FINDINGS:  Alignment is satisfactory.  No acute fracture, subluxation or dislocation.  No mass or foreign body.  Calcaneal spurring inferiorly.                                       Medications - No data to display  Medical Decision Making  Dwayne Candelario is a 53 y.o. male with a PMHx of HTN, who presents to the ED for evaluation of left foot swelling and pain beginning on Sunday. Patient reports complaints of pain and swelling to his left foot since Sunday, noting his pain radiates up his left lower leg upon ambulation. He denies recent falls or traumas. He denies history of similar symptoms. No medications taken PTA. No alleviating or exacerbating factors noted. Denies fever, CP, SOB, N/V, or other associated symptoms. NKDA.  Exam above.  History and physical exam is consistent with cellulitis of the left foot.  I will start patient on Keflex.  Will also discharge with naproxen for pain relief.  Follow up with PCP in 1 week.  Return to ED if symptoms worsen.    Of note: Ddx to include but not limited to:  Cellulitis, gout, sprain      Risk  Prescription drug management.            Scribe Attestation:   Scribe #1: I performed the above scribed service and the documentation accurately describes the services I performed. I attest to the accuracy of the note.                        I, Kate Flores, personally performed the services described in this  documentation. All medical record entries made by the scribe were at my direction and in my presence. I have reviewed the chart and agree that the record reflects my personal performance and is accurate and complete.    Clinical Impression:   Final diagnoses:  [M79.672] Left foot pain  [L03.119] Cellulitis of foot (Primary)        ED Disposition Condition    Discharge Stable          ED Prescriptions       Medication Sig Dispense Start Date End Date Auth. Provider    naproxen (NAPROSYN) 500 MG tablet Take 1 tablet (500 mg total) by mouth 2 (two) times daily with meals. for 5 days 10 tablet 11/1/2023 11/6/2023 Kate Flores PA-C    cephALEXin (KEFLEX) 500 MG capsule Take 1 capsule (500 mg total) by mouth 4 (four) times daily. for 5 days 20 capsule 11/1/2023 11/6/2023 Kate Flores PA-C          Follow-up Information       Follow up With Specialties Details Why Contact Info    Darien Root MD Family Medicine Schedule an appointment as soon as possible for a visit in 1 week For follow up [address] [facility] [address]  [phone]      [facility] ED Emergency Medicine Go to  As needed, If symptoms worsen [address] [facility] [address] [phone]             Kate Flores PA-C  11/01/23 8411

## 2024-04-05 NOTE — LETTER
October 2, 2017      [facility]  [address] [facility]  [place] [address]  Phone: [phone]  Fax: [phone]       Patient: Dwayne Candelario   YOB: 1970  Date of Visit: 10/02/2017    To Whom It May Concern:    Keyonna Candelario  was at [facility] on 10/02/2017. He may return to work/school on 10/30/2017. Pending evaluation from physician.     Sincerely,    Devika Atkins MA      Ric seizure reporting form completed.  Please fax and scan into chart. Thanks

## 2024-05-02 ENCOUNTER — HOSPITAL ENCOUNTER (EMERGENCY)
Facility: HOSPITAL | Age: 54
Discharge: HOME | End: 2024-05-02
Attending: INTERNAL MEDICINE

## 2024-05-02 VITALS
TEMPERATURE: 99 F | BODY MASS INDEX: 29.03 KG/M2 | OXYGEN SATURATION: 97 % | HEART RATE: 77 BPM | HEIGHT: 67 IN | RESPIRATION RATE: 20 BRPM | DIASTOLIC BLOOD PRESSURE: 89 MMHG | WEIGHT: 185 LBS | SYSTOLIC BLOOD PRESSURE: 131 MMHG

## 2024-05-02 DIAGNOSIS — R55 SYNCOPE, UNSPECIFIED SYNCOPE TYPE: Primary | ICD-10-CM

## 2024-05-02 DIAGNOSIS — B34.9 VIRAL SYNDROME: ICD-10-CM

## 2024-05-02 LAB
ALBUMIN SERPL-MCNC: 4 G/DL (ref 3.3–5.5)
ALP SERPL-CCNC: 74 U/L (ref 42–141)
BILIRUB SERPL-MCNC: 1.4 MG/DL (ref 0.2–1.6)
BUN SERPL-MCNC: 15 MG/DL (ref 7–22)
CALCIUM SERPL-MCNC: 9.7 MG/DL (ref 8–10.3)
CHLORIDE SERPL-SCNC: 102 MMOL/L (ref 98–108)
CREAT SERPL-MCNC: 1.1 MG/DL (ref 0.6–1.2)
CTP QC/QA: YES
GLUCOSE SERPL-MCNC: 133 MG/DL (ref 73–118)
HCT, POC: NORMAL
HGB, POC: NORMAL (ref 14–18)
INFLUENZA A ANTIGEN, POC: NEGATIVE
INFLUENZA B ANTIGEN, POC: NEGATIVE
MCH, POC: NORMAL
MCHC, POC: NORMAL
MCV, POC: NORMAL
MPV, POC: NORMAL
OHS QRS DURATION: 80 MS
OHS QTC CALCULATION: 421 MS
POC ALT (SGPT): 17 U/L (ref 10–47)
POC AST (SGOT): 20 U/L (ref 11–38)
POC CARDIAC TROPONIN I: 0 NG/ML (ref 0–0.08)
POC PLATELET COUNT: NORMAL
POC TCO2: 26 MMOL/L (ref 18–33)
POCT GLUCOSE: 121 MG/DL (ref 70–110)
POTASSIUM BLD-SCNC: 3.4 MMOL/L (ref 3.6–5.1)
PROTEIN, POC: 7.5 G/DL (ref 6.4–8.1)
RBC, POC: NORMAL
RDW, POC: NORMAL
SAMPLE: NORMAL
SARS-COV-2 RDRP RESP QL NAA+PROBE: NEGATIVE
SODIUM BLD-SCNC: 140 MMOL/L (ref 128–145)
WBC, POC: NORMAL

## 2024-05-02 PROCEDURE — 87804 INFLUENZA ASSAY W/OPTIC: CPT | Mod: 59,ER

## 2024-05-02 PROCEDURE — 87635 SARS-COV-2 COVID-19 AMP PRB: CPT | Mod: ER | Performed by: INTERNAL MEDICINE

## 2024-05-02 PROCEDURE — 99284 EMERGENCY DEPT VISIT MOD MDM: CPT | Mod: 25,ER

## 2024-05-02 PROCEDURE — 96361 HYDRATE IV INFUSION ADD-ON: CPT | Mod: ER

## 2024-05-02 PROCEDURE — 96374 THER/PROPH/DIAG INJ IV PUSH: CPT | Mod: ER

## 2024-05-02 PROCEDURE — 63600175 PHARM REV CODE 636 W HCPCS: Mod: ER | Performed by: INTERNAL MEDICINE

## 2024-05-02 PROCEDURE — 82962 GLUCOSE BLOOD TEST: CPT | Mod: ER

## 2024-05-02 PROCEDURE — 25000003 PHARM REV CODE 250: Mod: ER | Performed by: INTERNAL MEDICINE

## 2024-05-02 RX ORDER — ONDANSETRON 4 MG/1
4 TABLET, ORALLY DISINTEGRATING ORAL EVERY 8 HOURS PRN
Qty: 10 TABLET | Refills: 0 | Status: SHIPPED | OUTPATIENT
Start: 2024-05-02

## 2024-05-02 RX ORDER — AZELASTINE 1 MG/ML
2 SPRAY, METERED NASAL 2 TIMES DAILY
Qty: 30 ML | Refills: 0 | Status: SHIPPED | OUTPATIENT
Start: 2024-05-02 | End: 2024-06-26

## 2024-05-02 RX ORDER — IBUPROFEN 800 MG/1
800 TABLET ORAL EVERY 8 HOURS PRN
Qty: 30 TABLET | Refills: 0 | Status: SHIPPED | OUTPATIENT
Start: 2024-05-02

## 2024-05-02 RX ORDER — ONDANSETRON HYDROCHLORIDE 2 MG/ML
4 INJECTION, SOLUTION INTRAVENOUS
Status: COMPLETED | OUTPATIENT
Start: 2024-05-02 | End: 2024-05-02

## 2024-05-02 RX ORDER — FLUTICASONE PROPIONATE 50 MCG
2 SPRAY, SUSPENSION (ML) NASAL DAILY
Qty: 15 G | Refills: 0 | Status: SHIPPED | OUTPATIENT
Start: 2024-05-02

## 2024-05-02 RX ADMIN — SODIUM CHLORIDE 1000 ML: 9 INJECTION, SOLUTION INTRAVENOUS at 01:05

## 2024-05-02 RX ADMIN — ONDANSETRON 4 MG: 2 INJECTION INTRAMUSCULAR; INTRAVENOUS at 01:05

## 2024-05-02 RX ADMIN — POTASSIUM BICARBONATE 40 MEQ: 391 TABLET, EFFERVESCENT ORAL at 02:05

## 2024-05-02 NOTE — ED PROVIDER NOTES
Encounter Date: 5/2/2024       History     Chief Complaint   Patient presents with    Loss of Consciousness     Pt reports that he was feeling nauseated and walked to the bathroom when he leaned over to vomit he reports the next thing he remembered was waking up in the bathtub in the water.  He is alert, oriented denies any pain or injury.       53-year-old male with a past medical history significant for hypertension presents to emergency department complaining of syncopal episode at home today.  Patient states he became nauseated at home and leaned over his toilet to vomit.  Afterwards, patient states he awoke in his bath filled with water.  He denies fever/chills/diarrhea/chest pain/shortness breath/focal weakness.  He states after he awoke, he felt tingling in both hands, but no weakness.  He denies headache also.    The history is provided by the patient. No  was used.     Review of patient's allergies indicates:  No Known Allergies  Past Medical History:   Diagnosis Date    Allergy     Hypertension      No past surgical history on file.  Family History   Problem Relation Name Age of Onset    Cancer Father          colon    No Known Problems Mother      No Known Problems Sister      No Known Problems Brother      No Known Problems Maternal Aunt      No Known Problems Maternal Uncle      No Known Problems Paternal Aunt      No Known Problems Paternal Uncle      No Known Problems Maternal Grandmother      No Known Problems Maternal Grandfather      No Known Problems Paternal Grandmother      No Known Problems Paternal Grandfather      Amblyopia Neg Hx      Blindness Neg Hx      Cataracts Neg Hx      Diabetes Neg Hx      Glaucoma Neg Hx      Hypertension Neg Hx      Macular degeneration Neg Hx      Retinal detachment Neg Hx      Strabismus Neg Hx      Stroke Neg Hx      Thyroid disease Neg Hx       Social History     Tobacco Use    Smoking status: Never    Smokeless tobacco: Never   Substance  Use Topics    Alcohol use: No     Comment: occasional    Drug use: No     Review of Systems   Constitutional:  Negative for chills and fever.   Respiratory:  Negative for shortness of breath.    Cardiovascular:  Negative for chest pain.   Gastrointestinal:  Positive for nausea and vomiting. Negative for abdominal distention, abdominal pain and diarrhea.   Neurological:  Positive for syncope. Negative for tremors, seizures, facial asymmetry, speech difficulty, weakness and headaches.   All other systems reviewed and are negative.      Physical Exam     Initial Vitals [05/02/24 0016]   BP Pulse Resp Temp SpO2   (!) 152/74 85 20 98.6 °F (37 °C) 97 %      MAP       --         Physical Exam    Nursing note and vitals reviewed.  Constitutional: He is not diaphoretic. No distress.   HENT:   Head: Normocephalic and atraumatic.   Right Ear: External ear normal.   Left Ear: External ear normal.   Mouth/Throat: Oropharynx is clear and moist.   Clear postnasal drip, posterior oropharyngeal erythema without exudate or edema, clear nasal discharge   Eyes: Conjunctivae and EOM are normal. Pupils are equal, round, and reactive to light.   Neck: Neck supple.   Normal range of motion.  Cardiovascular:  Normal rate, regular rhythm, normal heart sounds and intact distal pulses.           Pulmonary/Chest: Breath sounds normal. No respiratory distress.   Abdominal: Abdomen is soft. Bowel sounds are normal. He exhibits no distension. There is no abdominal tenderness.   Musculoskeletal:         General: Normal range of motion.      Cervical back: Normal range of motion and neck supple.     Neurological: He is alert and oriented to person, place, and time. He has normal strength and normal reflexes. No cranial nerve deficit or sensory deficit. GCS score is 15. GCS eye subscore is 4. GCS verbal subscore is 5. GCS motor subscore is 6.   Skin: Skin is warm and dry. Capillary refill takes less than 2 seconds.   Psychiatric: He has a normal mood  and affect. Thought content normal.         ED Course   Procedures  Labs Reviewed   POCT GLUCOSE - Abnormal; Notable for the following components:       Result Value    POCT Glucose 121 (*)     All other components within normal limits   POCT CMP - Abnormal; Notable for the following components:    POC Glucose 133 (*)     POC Potassium 3.4 (*)     All other components within normal limits   TROPONIN ISTAT   POCT CBC   SARS-COV-2 RDRP GENE    Narrative:     This test utilizes isothermal nucleic acid amplification technology to detect the SARS-CoV-2 RdRp nucleic acid segment. The analytical sensitivity (limit of detection) is 500 copies/swab.     A POSITIVE result is indicative of the presence of SARS-CoV-2 RNA; clinical correlation with patient history and other diagnostic information is necessary to determine patient infection status.    A NEGATIVE result means that SARS-CoV-2 nucleic acids are not present above the limit of detection. A NEGATIVE result should be treated as presumptive. It does not rule out the possibility of COVID-19 and should not be the sole basis for treatment decisions. If COVID-19 is strongly suspected based on clinical and exposure history, re-testing using an alternate molecular assay should be considered.     Commercial kits are provided by [email or web address]   _________________________________________________________________   The authorized Fact Sheet for Healthcare Providers and the authorized Fact Sheet for Patients of the ID NOW COVID-19 are available on the FDA website:    https://www.fda.gov/media/465546/download      https://www.fda.gov/media/106776/download      POCT INFLUENZA A/B MOLECULAR   POCT GLUCOSE MONITORING CONTINUOUS   POCT CMP   POCT TROPONIN   POCT RAPID INFLUENZA A/B     EKG Readings: (Independently Interpreted)   Normal sinus rhythm, rate of 88 beats per minute, nonspecific T-wave changes, normal ST, no STEMI       Imaging Results              CT Head Without  Contrast (In process)                      Medications   ondansetron injection 4 mg (4 mg Intravenous Given 5/2/24 0100)   sodium chloride 0.9% bolus 1,000 mL 1,000 mL (0 mLs Intravenous Stopped 5/2/24 0200)   potassium bicarbonate disintegrating tablet 40 mEq (40 mEq Oral Given 5/2/24 0203)     Medical Decision Making  53-year-old male with a past medical history significant for hypertension presents to emergency department complaining of syncopal episode at home today.  Patient states he became nauseated at home and leaned over his toilet to vomit.  Afterwards, patient states he awoke in his bath filled with water.  He denies fever/chills/diarrhea/chest pain/shortness breath/focal weakness.  He states after he awoke, he felt tingling in both hands, but no weakness.  He denies headache also.  Course of ED stay:  1. Cardiovascular-patient has been hemodynamically stable throughout ED stay.  Troponin was normal.  EKG shows no acute abnormalities.  2. Pulmonary-patient has had no signs or symptoms of respiratory distress and O2 sats have been normal on room air.  3. Hematology/infectious disease-patient has been afebrile.  CBC was reassuring except for elevated white blood cell count (15,000).  Rapid flu and COVID-19 screens were negative.  4. GI/nutrition/renal/endocrine-CMP was reassuring.  Supplemental potassium was given for mild hypokalemia.  Patient received fluid bolus and Zofran.  Patient states he feels much better after medication and fluids.  5. Neuro-CT head was within normal limits.  Patient states he feels back to baseline prior to discharge and was given instructions for syncopal episode as well as viral URI.  Prescriptions for Astelin/fluticasone/ibuprofen/Zofran were given prior to discharge and patient was advised to follow-up with his primary care physician within the next 2 days for re-evaluation/return to the emergency department if condition worsens.    Amount and/or Complexity of Data  Reviewed  Labs: ordered.  Radiology: ordered.    Risk  Prescription drug management.      Additional MDM:     NIH Stroke Scale:   Interval = baseline (upon arrival/admit)  Level of consciousness = 0 - alert  LOC questions = 0 - answers both correctly  LOC commands = 0 - performs both correctly  Best gaze = 0 - normal  Visual = 0 - no visual loss  Facial palsy = 0 - normal  Motor left arm =  0 - no drift  Motor right arm =  0 - no drift  Motor left leg = 0 - no drift  Motor right leg =  0 - no drift  Limb ataxia = 0 - absent  Sensory = 0 - normal  Best language = 0 - no aphasia  Dysarthria = 0 - normal articulation  Extinction and inattention = 0 - no neglect  NIH Stroke Scale Total = 0                                     Clinical Impression:  Final diagnoses:  [R55] Syncope, unspecified syncope type (Primary)  [B34.9] Viral syndrome          ED Disposition Condition    Discharge Stable          ED Prescriptions       Medication Sig Dispense Start Date End Date Auth. Provider    azelastine (ASTELIN) 137 mcg (0.1 %) nasal spray 2 sprays (274 mcg total) by Nasal route 2 (two) times daily. 30 mL 5/2/2024 6/26/2024 Sancho Mcclure MD    fluticasone propionate (FLONASE) 50 mcg/actuation nasal spray 2 sprays (100 mcg total) by Each Nostril route once daily. 15 g 5/2/2024 -- Sancho Mcclure MD    ondansetron (ZOFRAN-ODT) 4 MG TbDL Take 1 tablet (4 mg total) by mouth every 8 (eight) hours as needed (Pain). 10 tablet 5/2/2024 -- Sancho Mcclure MD    ibuprofen (ADVIL,MOTRIN) 800 MG tablet Take 1 tablet (800 mg total) by mouth every 8 (eight) hours as needed for Pain. 30 tablet 5/2/2024 -- Sancho Mcclure MD          Follow-up Information       Follow up With Specialties Details Why Contact Info    Darien Root MD Family Medicine Schedule an appointment as soon as possible for a visit in 2 days For reevaluation [address] [facility] [address]  [phone]               Sancho Mcclure MD  05/02/24  [id number]